# Patient Record
Sex: FEMALE | Race: WHITE | NOT HISPANIC OR LATINO | Employment: OTHER | URBAN - METROPOLITAN AREA
[De-identification: names, ages, dates, MRNs, and addresses within clinical notes are randomized per-mention and may not be internally consistent; named-entity substitution may affect disease eponyms.]

---

## 2018-03-29 ENCOUNTER — OFFICE VISIT CONVERTED (OUTPATIENT)
Dept: CARDIOLOGY | Facility: CLINIC | Age: 83
End: 2018-03-29
Attending: INTERNAL MEDICINE

## 2018-07-13 ENCOUNTER — OFFICE VISIT CONVERTED (OUTPATIENT)
Dept: CARDIOLOGY | Facility: CLINIC | Age: 83
End: 2018-07-13
Attending: INTERNAL MEDICINE

## 2018-07-13 ENCOUNTER — CONVERSION ENCOUNTER (OUTPATIENT)
Dept: CARDIOLOGY | Facility: CLINIC | Age: 83
End: 2018-07-13

## 2019-01-17 ENCOUNTER — CONVERSION ENCOUNTER (OUTPATIENT)
Dept: OTHER | Facility: HOSPITAL | Age: 84
End: 2019-01-17

## 2019-01-17 ENCOUNTER — OFFICE VISIT CONVERTED (OUTPATIENT)
Dept: CARDIOLOGY | Facility: CLINIC | Age: 84
End: 2019-01-17
Attending: INTERNAL MEDICINE

## 2019-06-25 ENCOUNTER — HOSPITAL ENCOUNTER (OUTPATIENT)
Dept: URGENT CARE | Facility: CLINIC | Age: 84
Discharge: HOME | End: 2019-06-25
Attending: PHYSICIAN ASSISTANT

## 2019-08-02 ENCOUNTER — OFFICE VISIT CONVERTED (OUTPATIENT)
Dept: CARDIOLOGY | Facility: CLINIC | Age: 84
End: 2019-08-02
Attending: INTERNAL MEDICINE

## 2020-02-27 ENCOUNTER — OFFICE VISIT CONVERTED (OUTPATIENT)
Dept: CARDIOLOGY | Facility: CLINIC | Age: 85
End: 2020-02-27
Attending: INTERNAL MEDICINE

## 2020-09-08 ENCOUNTER — HOSPITAL ENCOUNTER (OUTPATIENT)
Dept: URGENT CARE | Facility: CLINIC | Age: 85
Discharge: HOME | End: 2020-09-08

## 2021-04-15 ENCOUNTER — OFFICE VISIT CONVERTED (OUTPATIENT)
Dept: CARDIOLOGY | Facility: CLINIC | Age: 86
End: 2021-04-15
Attending: INTERNAL MEDICINE

## 2021-05-14 VITALS
HEIGHT: 62 IN | DIASTOLIC BLOOD PRESSURE: 84 MMHG | WEIGHT: 130 LBS | SYSTOLIC BLOOD PRESSURE: 154 MMHG | BODY MASS INDEX: 23.92 KG/M2 | HEART RATE: 92 BPM

## 2021-05-14 NOTE — PROGRESS NOTES
"   Progress Note      Patient Name: Sarina Trevino   Patient ID: [id number]   Sex: Female   YOB: 1932    Primary Care Provider: Allie GARCIA   Referring Provider: Andrés Curiel MD    Visit Date: April 15, 2021    Provider: Andrés Curiel MD   Location: [facility]   Location Address: [address] [place] [address], [facility]   [place], [place]  [id number]   Location Phone: [phone]          Chief Complaint     CAD.       History Of Present Illness  REFERRING CARE PROVIDER: Andrés Curiel MD   Sarina Trevino is a 88 year old /White female with known coronary artery disease with previous hypertension and dyslipidemia who has not had any new problems at home. She states that her blood pressure is sometimes on the low side. She has stable intermittent heart palpitations.   PAST MEDICAL HISTORY: Positive for coronary artery disease, hypertension and dyslipidemia.   PSYCHOSOCIAL HISTORY: Denies use of alcohol or tobacco.   CURRENT MEDICATIONS: Medications have been reviewed and are as stated.      ALLERGIES:  Codeine; Sulfa.       Review of Systems  · Cardiovascular  o Admits  o : palpitations (fast, fluttering, or skipping beats), shortness of breath while walking or lying flat  o Denies  o : swelling (feet, ankles, hands), chest pain or angina pectoris   · Respiratory  o Denies  o : chronic or frequent cough      Vitals  Date Time BP Position Site L\R Cuff Size HR RR TEMP (F) WT  HT  BMI kg/m2 BSA m2 O2 Sat FR L/min FiO2 HC       04/15/2021 02:14 /84 Sitting    92 - R   130lbs 0oz 5'  2\" 23.78 1.61       04/15/2021 02:14 /76 Sitting    90 - R                   Physical Examination  · Constitutional  o Appearance  o : Awake, alert, in no acute distress.   · Eyes  o Conjunctivae  o : Normal.  · Ears, Nose, Mouth and Throat  o Oral Cavity  o :   § Oral Mucosa  § : Normal.  · Neck  o Inspection/Palpation  o : No JVD. Good carotid upstroke. No thyromegaly.  · Respiratory  o Respiratory  o : Good " respiratory effort. Clear to percussion and auscultation.  · Cardiovascular  o Heart  o :   § Auscultation of Heart  § : S1, S2 normal. Regular rate and rhythm without murmurs, gallops, or rubs.  o Peripheral Vascular System  o :   § Extremities  § : Good femoral and pedal pulses. No pedal edema.  · Gastrointestinal  o Abdominal Examination  o : Soft. No tenderness or masses felt. No hepatosplenomegaly. Abdominal aorta is not palpable.          Assessment     1.  Coronary artery disease with no ongoing anginal discomfort, continue with chronic aspirin 81 mg once a day.  2.  Hypertension. Elevated today, suspect white coat in nature. Recommended a home blood pressure long for monitoring and no changes made.         Adnrés Curiel MD  /               Electronically Signed by: Layne Mccarty-, OT -Author on April 22, 2021 08:34:09 AM  Electronically Co-signed by: Andrés Curiel MD -Reviewer on April 26, 2021 07:51:51 AM

## 2021-05-15 VITALS
HEART RATE: 64 BPM | WEIGHT: 150 LBS | BODY MASS INDEX: 26.58 KG/M2 | DIASTOLIC BLOOD PRESSURE: 64 MMHG | SYSTOLIC BLOOD PRESSURE: 112 MMHG | HEIGHT: 63 IN

## 2021-05-15 VITALS
HEIGHT: 63 IN | DIASTOLIC BLOOD PRESSURE: 88 MMHG | BODY MASS INDEX: 26.05 KG/M2 | SYSTOLIC BLOOD PRESSURE: 148 MMHG | WEIGHT: 147 LBS | HEART RATE: 60 BPM

## 2021-05-16 VITALS
HEIGHT: 63 IN | DIASTOLIC BLOOD PRESSURE: 84 MMHG | WEIGHT: 146 LBS | HEART RATE: 58 BPM | BODY MASS INDEX: 25.87 KG/M2 | SYSTOLIC BLOOD PRESSURE: 160 MMHG

## 2021-05-16 VITALS
HEART RATE: 72 BPM | BODY MASS INDEX: 26.75 KG/M2 | HEIGHT: 63 IN | SYSTOLIC BLOOD PRESSURE: 182 MMHG | WEIGHT: 151 LBS | DIASTOLIC BLOOD PRESSURE: 80 MMHG

## 2021-05-16 VITALS
HEIGHT: 63 IN | DIASTOLIC BLOOD PRESSURE: 80 MMHG | SYSTOLIC BLOOD PRESSURE: 142 MMHG | WEIGHT: 149 LBS | HEART RATE: 64 BPM | BODY MASS INDEX: 26.4 KG/M2

## 2021-10-20 ENCOUNTER — OFFICE VISIT (OUTPATIENT)
Dept: CARDIOLOGY | Facility: CLINIC | Age: 86
End: 2021-10-20

## 2021-10-20 VITALS
WEIGHT: 125 LBS | BODY MASS INDEX: 23 KG/M2 | DIASTOLIC BLOOD PRESSURE: 72 MMHG | HEART RATE: 56 BPM | HEIGHT: 62 IN | SYSTOLIC BLOOD PRESSURE: 106 MMHG

## 2021-10-20 DIAGNOSIS — E78.5 HYPERLIPIDEMIA LDL GOAL <70: ICD-10-CM

## 2021-10-20 DIAGNOSIS — I25.10 CORONARY ARTERY DISEASE INVOLVING NATIVE CORONARY ARTERY OF NATIVE HEART WITHOUT ANGINA PECTORIS: Primary | ICD-10-CM

## 2021-10-20 DIAGNOSIS — I10 ESSENTIAL HYPERTENSION: ICD-10-CM

## 2021-10-20 PROBLEM — J30.9 ALLERGIC RHINITIS: Status: ACTIVE | Noted: 2021-10-20

## 2021-10-20 PROBLEM — M19.90 ARTHRITIS: Status: ACTIVE | Noted: 2021-10-20

## 2021-10-20 PROCEDURE — 99213 OFFICE O/P EST LOW 20 MIN: CPT | Performed by: NURSE PRACTITIONER

## 2021-10-20 RX ORDER — ASPIRIN 81 MG/1
81 TABLET ORAL DAILY
COMMUNITY

## 2021-10-20 RX ORDER — SENNOSIDES 8.6 MG
650 CAPSULE ORAL EVERY 8 HOURS PRN
COMMUNITY
End: 2022-01-09 | Stop reason: HOSPADM

## 2021-10-20 RX ORDER — METOPROLOL SUCCINATE 25 MG/1
25 TABLET, EXTENDED RELEASE ORAL DAILY
COMMUNITY
Start: 2021-07-27 | End: 2021-10-27 | Stop reason: SDUPTHER

## 2021-10-20 RX ORDER — TELMISARTAN 80 MG/1
160 TABLET ORAL DAILY
COMMUNITY
Start: 2021-09-09 | End: 2021-10-27

## 2021-10-20 RX ORDER — NETARSUDIL 0.2 MG/ML
SOLUTION/ DROPS OPHTHALMIC; TOPICAL
COMMUNITY
Start: 2021-07-27 | End: 2022-01-02

## 2021-10-20 RX ORDER — CETIRIZINE HYDROCHLORIDE 10 MG/1
TABLET ORAL
COMMUNITY
End: 2022-01-02

## 2021-10-20 RX ORDER — HYDROCHLOROTHIAZIDE 25 MG/1
25 TABLET ORAL DAILY
COMMUNITY
Start: 2021-09-09 | End: 2021-10-27 | Stop reason: SDUPTHER

## 2021-10-20 NOTE — PATIENT INSTRUCTIONS
"Low-Sodium Eating Plan  Sodium, which is an element that makes up salt, helps you maintain a healthy balance of fluids in your body. Too much sodium can increase your blood pressure and cause fluid and waste to be held in your body.  Your health care provider or dietitian may recommend following this plan if you have high blood pressure (hypertension), kidney disease, liver disease, or heart failure. Eating less sodium can help lower your blood pressure, reduce swelling, and protect your heart, liver, and kidneys.  What are tips for following this plan?  Reading food labels  · The Nutrition Facts label lists the amount of sodium in one serving of the food. If you eat more than one serving, you must multiply the listed amount of sodium by the number of servings.  · Choose foods with less than 140 mg of sodium per serving.  · Avoid foods with 300 mg of sodium or more per serving.  Shopping    · Look for lower-sodium products, often labeled as \"low-sodium\" or \"no salt added.\"  · Always check the sodium content, even if foods are labeled as \"unsalted\" or \"no salt added.\"  · Buy fresh foods.  ? Avoid canned foods and pre-made or frozen meals.  ? Avoid canned, cured, or processed meats.  · Buy breads that have less than 80 mg of sodium per slice.    Cooking    · Eat more home-cooked food and less restaurant, buffet, and fast food.  · Avoid adding salt when cooking. Use salt-free seasonings or herbs instead of table salt or sea salt. Check with your health care provider or pharmacist before using salt substitutes.  · Cook with plant-based oils, such as canola, sunflower, or olive oil.    Meal planning  · When eating at a restaurant, ask that your food be prepared with less salt or no salt, if possible. Avoid dishes labeled as brined, pickled, cured, smoked, or made with soy sauce, miso, or teriyaki sauce.  · Avoid foods that contain MSG (monosodium glutamate). MSG is sometimes added to Chinese food, bouillon, and some " "canned foods.  · Make meals that can be grilled, baked, poached, roasted, or steamed. These are generally made with less sodium.  General information  Most people on this plan should limit their sodium intake to 1,500-2,000 mg (milligrams) of sodium each day.  What foods should I eat?  Fruits  Fresh, frozen, or canned fruit. Fruit juice.  Vegetables  Fresh or frozen vegetables. \"No salt added\" canned vegetables. \"No salt added\" tomato sauce and paste. Low-sodium or reduced-sodium tomato and vegetable juice.  Grains  Low-sodium cereals, including oats, puffed wheat and rice, and shredded wheat. Low-sodium crackers. Unsalted rice. Unsalted pasta. Low-sodium bread. Whole-grain breads and whole-grain pasta.  Meats and other proteins  Fresh or frozen (no salt added) meat, poultry, seafood, and fish. Low-sodium canned tuna and salmon. Unsalted nuts. Dried peas, beans, and lentils without added salt. Unsalted canned beans. Eggs. Unsalted nut butters.  Dairy  Milk. Soy milk. Cheese that is naturally low in sodium, such as ricotta cheese, fresh mozzarella, or Swiss cheese. Low-sodium or reduced-sodium cheese. Cream cheese. Yogurt.  Seasonings and condiments  Fresh and dried herbs and spices. Salt-free seasonings. Low-sodium mustard and ketchup. Sodium-free salad dressing. Sodium-free light mayonnaise. Fresh or refrigerated horseradish. Lemon juice. Vinegar.  Other foods  Homemade, reduced-sodium, or low-sodium soups. Unsalted popcorn and pretzels. Low-salt or salt-free chips.  The items listed above may not be a complete list of foods and beverages you can eat. Contact a dietitian for more information.  What foods should I avoid?  Vegetables  Sauerkraut, pickled vegetables, and relishes. Olives. French fries. Onion rings. Regular canned vegetables (not low-sodium or reduced-sodium). Regular canned tomato sauce and paste (not low-sodium or reduced-sodium). Regular tomato and vegetable juice (not low-sodium or reduced-sodium). " Frozen vegetables in sauces.  Grains  Instant hot cereals. Bread stuffing, pancake, and biscuit mixes. Croutons. Seasoned rice or pasta mixes. Noodle soup cups. Boxed or frozen macaroni and cheese. Regular salted crackers. Self-rising flour.  Meats and other proteins  Meat or fish that is salted, canned, smoked, spiced, or pickled. Precooked or cured meat, such as sausages or meat loaves. He. Ham. Pepperoni. Hot dogs. Corned beef. Chipped beef. Salt pork. Jerky. Pickled herring. Anchovies and sardines. Regular canned tuna. Salted nuts.  Dairy  Processed cheese and cheese spreads. Hard cheeses. Cheese curds. Blue cheese. Feta cheese. String cheese. Regular cottage cheese. Buttermilk. Canned milk.  Fats and oils  Salted butter. Regular margarine. Ghee. He fat.  Seasonings and condiments  Onion salt, garlic salt, seasoned salt, table salt, and sea salt. Canned and packaged gravies. Worcestershire sauce. Tartar sauce. Barbecue sauce. Teriyaki sauce. Soy sauce, including reduced-sodium. Steak sauce. Fish sauce. Oyster sauce. Cocktail sauce. Horseradish that you find on the shelf. Regular ketchup and mustard. Meat flavorings and tenderizers. Bouillon cubes. Hot sauce. Pre-made or packaged marinades. Pre-made or packaged taco seasonings. Relishes. Regular salad dressings. Salsa.  Other foods  Salted popcorn and pretzels. Corn chips and puffs. Potato and tortilla chips. Canned or dried soups. Pizza. Frozen entrees and pot pies.  The items listed above may not be a complete list of foods and beverages you should avoid. Contact a dietitian for more information.  Summary  · Eating less sodium can help lower your blood pressure, reduce swelling, and protect your heart, liver, and kidneys.  · Most people on this plan should limit their sodium intake to 1,500-2,000 mg (milligrams) of sodium each day.  · Canned, boxed, and frozen foods are high in sodium. Restaurant foods, fast foods, and pizza are also very high in sodium.  You also get sodium by adding salt to food.  · Try to cook at home, eat more fresh fruits and vegetables, and eat less fast food and canned, processed, or prepared foods.  This information is not intended to replace advice given to you by your health care provider. Make sure you discuss any questions you have with your health care provider.  Document Revised: 01/22/2021 Document Reviewed: 11/18/2020  [employer] Patient Education © 2021 [employer]      Cholesterol Content in Foods  Cholesterol is a waxy, fat-like substance that helps to carry fat in the blood. The body needs cholesterol in small amounts, but too much cholesterol can cause damage to the arteries and heart.  Most people should eat less than 200 milligrams (mg) of cholesterol a day.  Foods with cholesterol    Cholesterol is found in animal-based foods, such as meat, seafood, and dairy. Generally, low-fat dairy and lean meats have less cholesterol than full-fat dairy and fatty meats. The milligrams of cholesterol per serving (mg per serving) of common cholesterol-containing foods are listed below.  Meat and other proteins  · Egg -- one large whole egg has 186 mg.  · Veal shank -- 4 oz has 141 mg.  · Lean ground turkey (93% lean) -- 4 oz has 118 mg.  · Fat-trimmed lamb loin -- 4 oz has 106 mg.  · Lean ground beef (90% lean) -- 4 oz has 100 mg.  · Lobster -- 3.5 oz has 90 mg.  · Pork loin chops -- 4 oz has 86 mg.  · Canned salmon -- 3.5 oz has 83 mg.  · Fat-trimmed beef top loin -- 4 oz has 78 mg.  · Frankfurter -- 1 evelia (3.5 oz) has 77 mg.  · Crab -- 3.5 oz has 71 mg.  · Roasted chicken without skin, white meat -- 4 oz has 66 mg.  · Light bologna -- 2 oz has 45 mg.  · Deli-cut turkey -- 2 oz has 31 mg.  · Canned tuna -- 3.5 oz has 31 mg.  · He -- 1 oz has 29 mg.  · Oysters and mussels (raw) -- 3.5 oz has 25 mg.  · Mackerel -- 1 oz has 22 mg.  · Trout -- 1 oz has 20 mg.  · Pork sausage -- 1 link (1 oz) has 17 mg.  · Olean -- 1 oz has 16  mg.  · Tilapia -- 1 oz has 14 mg.  Dairy  · Soft-serve ice cream -- ½ cup (4 oz) has 103 mg.  · Whole-milk yogurt -- 1 cup (8 oz) has 29 mg.  · Cheddar cheese -- 1 oz has 28 mg.  · American cheese -- 1 oz has 28 mg.  · Whole milk -- 1 cup (8 oz) has 23 mg.  · 2% milk -- 1 cup (8 oz) has 18 mg.  · Cream cheese -- 1 tablespoon (Tbsp) has 15 mg.  · Cottage cheese -- ½ cup (4 oz) has 14 mg.  · Low-fat (1%) milk -- 1 cup (8 oz) has 10 mg.  · Sour cream -- 1 Tbsp has 8.5 mg.  · Low-fat yogurt -- 1 cup (8 oz) has 8 mg.  · Nonfat Greek yogurt -- 1 cup (8 oz) has 7 mg.  · Half-and-half cream -- 1 Tbsp has 5 mg.  Fats and oils  · Cod liver oil -- 1 tablespoon (Tbsp) has 82 mg.  · Butter -- 1 Tbsp has 15 mg.  · Lard -- 1 Tbsp has 14 mg.  · He grease -- 1 Tbsp has 14 mg.  · Mayonnaise -- 1 Tbsp has 5-10 mg.  · Margarine -- 1 Tbsp has 3-10 mg.  Exact amounts of cholesterol in these foods may vary depending on specific ingredients and brands.  Foods without cholesterol  Most plant-based foods do not have cholesterol unless you combine them with a food that has cholesterol. Foods without cholesterol include:  · Grains and cereals.  · Vegetables.  · Fruits.  · Vegetable oils, such as olive, canola, and sunflower oil.  · Legumes, such as peas, beans, and lentils.  · Nuts and seeds.  · Egg whites.  Summary  · The body needs cholesterol in small amounts, but too much cholesterol can cause damage to the arteries and heart.  · Most people should eat less than 200 milligrams (mg) of cholesterol a day.  This information is not intended to replace advice given to you by your health care provider. Make sure you discuss any questions you have with your health care provider.  Document Revised: 05/10/2021 Document Reviewed: 05/10/2021  [employer] Patient Education © 2021 [employer]

## 2021-10-20 NOTE — PROGRESS NOTES
Chief Complaint  Coronary Artery Disease, Hypertension, and Hyperlipidemia    Subjective            History of Present Illness  Sarina Trevino is a 89-year-old white/ female patient who presents to the office today for follow-up.  She has CAD, hypertension, and hyperlipidemia.  She admits to some confusion regarding her blood pressure medication dose.  She is currently prescribed telmisartan 80 mg twice daily.  She brought in a home blood pressure log that shows some blood pressures as low as 90/50 after taking the second dose.  She reports some fatigue and weakness when her blood pressure is so low.  She is wondering if she should be taking a second dose.  She denies any chest pain, shortness of breath, lightheadedness/dizziness, edema, or palpitations.    PMH  Past Medical History:   Diagnosis Date   • CAD 10/20/2021   • Cancer (HCC)    • Essential hypertension 10/20/2021   • Hyperlipidemia LDL goal <70 10/20/2021         ALLERGY  Allergies   Allergen Reactions   • Meloxicam Dizziness          SURGICALHX  History reviewed. No pertinent surgical history.       SOC  Social History     Socioeconomic History   • Marital status:    Tobacco Use   • Smoking status: Never Smoker   • Smokeless tobacco: Never Used   Vaping Use   • Vaping Use: Never used   Substance and Sexual Activity   • Alcohol use: Never   • Drug use: Never   • Sexual activity: Defer         FAMHX  History reviewed. No pertinent family history.       MEDSIGONLY  Current Outpatient Medications on File Prior to Visit   Medication Sig   • acetaminophen (TYLENOL) 650 MG 8 hr tablet acetaminophen 650 mg oral tablet extended release take 1 tablets (650 mg) by oral route every 8 hours swallowing whole with water. Do not break, crush, dissolve and/or chew.   Active   • aspirin (aspirin) 81 MG EC tablet Aspir-81 81 mg oral tablet,delayed release (DR/EC) take 1 tablet (81 mg) by oral route once daily   Active   • cetirizine (zyrTEC) 10 MG tablet  "cetirizine 10 mg oral tablet take 1 tablet (10 mg) by oral route once daily   Suspended   • hydroCHLOROthiazide (HYDRODIURIL) 25 MG tablet Take 25 mg by mouth Daily.   • metoprolol succinate XL (TOPROL-XL) 25 MG 24 hr tablet Take 25 mg by mouth Daily.   • Rhopressa 0.02 % solution instill 1 DROP IN EACH EYE EVERY NIGHT AT BEDTIME   • telmisartan (MICARDIS) 80 MG tablet Take 160 mg by mouth Daily.     No current facility-administered medications on file prior to visit.         Objective   /72   Pulse 56   Ht 157.5 cm (62\")   Wt 56.7 kg (125 lb)   BMI 22.86 kg/m²       Physical Exam  Constitutional:       Appearance: She is normal weight.   HENT:      Head: Normocephalic.   Neck:      Vascular: No carotid bruit.   Cardiovascular:      Rate and Rhythm: Normal rate and regular rhythm.      Pulses: Normal pulses.      Heart sounds: Normal heart sounds. No murmur heard.      Pulmonary:      Effort: Pulmonary effort is normal.      Breath sounds: Wheezing present.   Musculoskeletal:      Cervical back: Neck supple.      Right lower leg: No edema.      Left lower leg: No edema.   Skin:     General: Skin is dry.      Capillary Refill: Capillary refill takes less than 2 seconds.   Neurological:      Mental Status: She is alert and oriented to person, place, and time.   Psychiatric:         Mood and Affect: Mood normal.         Behavior: Behavior normal.       Result Review :   The following data was reviewed by: YOUNG Deras on 10/20/2021:  No results found for: PROBNP    No results found for: TSH   No results found for: FREET4   No results found for: DDIMERQUANT  No results found for: MG   No results found for: DIGOXIN   Lab Results   Component Value Date    TROPONINT <0.01 09/08/2020           CNP 9/8/2020  BUN 26  Creatinine 1.26  GFR 38  Sodium 135  Potassium 3.7  Alkaline phosphatase 83  ALT 12  AST 23    No recent lipid panel for review    She reports that she will be having labs done \"soon\" through " home health which was initiated this past week.  She states that she will have a copy of her results sent to this office for review.     Assessment and Plan    Diagnoses and all orders for this visit:    1. CAD (Primary)  Currently denies any anginal symptoms, continue aspirin 81 mg daily.    2. Essential hypertension  Currently controlled with low blood pressure readings at home.  New parameters for blood pressure given to patient.  Check blood pressure twice a day for the next two weeks, blood pressure log provided for patient.  89/49 or lower: Do not take Telmisartan  90//89: Take Telmisartan once daily  140/90 or higher: Take Telmisartan twice daily  Continue metoprolol 25 mg daily and hydrochlorothiazide 25 mg daily.    3. Hyperlipidemia LDL goal <70  We will review numerous lipid panel once available to me.  She is not currently on statin therapy at this time.            Follow Up   Return in about 6 months (around 4/20/2022) for Follow up with Dr Curiel.    Patient was given instructions and counseling regarding her condition or for health maintenance advice. Please see specific information pulled into the AVS if appropriate.     Sarina Trevino  reports that she has never smoked. She has never used smokeless tobacco.           Poonam Weston, APRN  10/20/21  11:55 EDT    Dictated Utilizing Dragon Dictation

## 2021-10-27 RX ORDER — METOPROLOL SUCCINATE 25 MG/1
25 TABLET, EXTENDED RELEASE ORAL DAILY
Qty: 90 TABLET | Refills: 3 | Status: SHIPPED | OUTPATIENT
Start: 2021-10-27

## 2021-10-27 RX ORDER — HYDROCHLOROTHIAZIDE 25 MG/1
25 TABLET ORAL DAILY
Qty: 90 TABLET | Refills: 3 | Status: SHIPPED | OUTPATIENT
Start: 2021-10-27

## 2021-10-27 RX ORDER — TELMISARTAN 80 MG/1
160 TABLET ORAL DAILY
Qty: 180 TABLET | Refills: 2 | Status: ON HOLD | OUTPATIENT
Start: 2021-10-27 | End: 2022-01-09 | Stop reason: SDUPTHER

## 2021-11-09 ENCOUNTER — TELEPHONE (OUTPATIENT)
Dept: CARDIOLOGY | Facility: CLINIC | Age: 86
End: 2021-11-09

## 2021-11-09 NOTE — TELEPHONE ENCOUNTER
Spoke with the pt and she understood to continue current regimen. She had no questions at this time.

## 2022-01-02 ENCOUNTER — APPOINTMENT (OUTPATIENT)
Dept: GENERAL RADIOLOGY | Facility: HOSPITAL | Age: 87
End: 2022-01-02

## 2022-01-02 ENCOUNTER — APPOINTMENT (OUTPATIENT)
Dept: CT IMAGING | Facility: HOSPITAL | Age: 87
End: 2022-01-02

## 2022-01-02 ENCOUNTER — HOSPITAL ENCOUNTER (INPATIENT)
Facility: HOSPITAL | Age: 87
LOS: 7 days | Discharge: NURSING, REHAB OR LONG-TERM CARE | End: 2022-01-09
Attending: EMERGENCY MEDICINE | Admitting: INTERNAL MEDICINE

## 2022-01-02 DIAGNOSIS — M25.552 LEFT HIP PAIN: Primary | ICD-10-CM

## 2022-01-02 DIAGNOSIS — S32.9XXS CLOSED NONDISPLACED FRACTURE OF PELVIS, UNSPECIFIED PART OF PELVIS, SEQUELA: ICD-10-CM

## 2022-01-02 DIAGNOSIS — R26.2 DIFFICULTY IN WALKING: ICD-10-CM

## 2022-01-02 DIAGNOSIS — R13.12 OROPHARYNGEAL DYSPHAGIA: ICD-10-CM

## 2022-01-02 DIAGNOSIS — S32.502A CLOSED FRACTURE OF LEFT PUBIS, UNSPECIFIED PORTION OF PUBIS, INITIAL ENCOUNTER: ICD-10-CM

## 2022-01-02 DIAGNOSIS — Z78.9 DECREASED ACTIVITIES OF DAILY LIVING (ADL): ICD-10-CM

## 2022-01-02 PROBLEM — S32.9XXA PELVIC FRACTURE: Status: ACTIVE | Noted: 2022-01-02

## 2022-01-02 LAB
ALBUMIN SERPL-MCNC: 4.3 G/DL (ref 3.5–5.2)
ALBUMIN/GLOB SERPL: 1.2 G/DL
ALP SERPL-CCNC: 82 U/L (ref 39–117)
ALT SERPL W P-5'-P-CCNC: 13 U/L (ref 1–33)
ANION GAP SERPL CALCULATED.3IONS-SCNC: 12.6 MMOL/L (ref 5–15)
AST SERPL-CCNC: 28 U/L (ref 1–32)
BACTERIA UR QL AUTO: ABNORMAL /HPF
BASOPHILS # BLD AUTO: 0.06 10*3/MM3 (ref 0–0.2)
BASOPHILS NFR BLD AUTO: 0.3 % (ref 0–1.5)
BILIRUB SERPL-MCNC: 0.6 MG/DL (ref 0–1.2)
BILIRUB UR QL STRIP: NEGATIVE
BUN SERPL-MCNC: 29 MG/DL (ref 8–23)
BUN/CREAT SERPL: 28.2 (ref 7–25)
CALCIUM SPEC-SCNC: 9.5 MG/DL (ref 8.6–10.5)
CHLORIDE SERPL-SCNC: 95 MMOL/L (ref 98–107)
CK SERPL-CCNC: 171 U/L (ref 20–180)
CLARITY UR: CLEAR
CO2 SERPL-SCNC: 26.4 MMOL/L (ref 22–29)
COLOR UR: YELLOW
CREAT SERPL-MCNC: 1.03 MG/DL (ref 0.57–1)
DEPRECATED RDW RBC AUTO: 43.2 FL (ref 37–54)
EOSINOPHIL # BLD AUTO: 0.04 10*3/MM3 (ref 0–0.4)
EOSINOPHIL NFR BLD AUTO: 0.2 % (ref 0.3–6.2)
ERYTHROCYTE [DISTWIDTH] IN BLOOD BY AUTOMATED COUNT: 12.6 % (ref 12.3–15.4)
GFR SERPL CREATININE-BSD FRML MDRD: 50 ML/MIN/1.73
GLOBULIN UR ELPH-MCNC: 3.5 GM/DL
GLUCOSE SERPL-MCNC: 109 MG/DL (ref 65–99)
GLUCOSE UR STRIP-MCNC: NEGATIVE MG/DL
HCT VFR BLD AUTO: 33.1 % (ref 34–46.6)
HGB BLD-MCNC: 11 G/DL (ref 12–15.9)
HGB UR QL STRIP.AUTO: NEGATIVE
HYALINE CASTS UR QL AUTO: ABNORMAL /LPF
IMM GRANULOCYTES # BLD AUTO: 0.17 10*3/MM3 (ref 0–0.05)
IMM GRANULOCYTES NFR BLD AUTO: 0.9 % (ref 0–0.5)
KETONES UR QL STRIP: ABNORMAL
LEUKOCYTE ESTERASE UR QL STRIP.AUTO: NEGATIVE
LYMPHOCYTES # BLD AUTO: 1.27 10*3/MM3 (ref 0.7–3.1)
LYMPHOCYTES NFR BLD AUTO: 7 % (ref 19.6–45.3)
MAGNESIUM SERPL-MCNC: 2.3 MG/DL (ref 1.6–2.4)
MCH RBC QN AUTO: 31 PG (ref 26.6–33)
MCHC RBC AUTO-ENTMCNC: 33.2 G/DL (ref 31.5–35.7)
MCV RBC AUTO: 93.2 FL (ref 79–97)
MONOCYTES # BLD AUTO: 0.89 10*3/MM3 (ref 0.1–0.9)
MONOCYTES NFR BLD AUTO: 4.9 % (ref 5–12)
NEUTROPHILS NFR BLD AUTO: 15.62 10*3/MM3 (ref 1.7–7)
NEUTROPHILS NFR BLD AUTO: 86.7 % (ref 42.7–76)
NITRITE UR QL STRIP: NEGATIVE
NRBC BLD AUTO-RTO: 0 /100 WBC (ref 0–0.2)
PH UR STRIP.AUTO: 7.5 [PH] (ref 5–8)
PLATELET # BLD AUTO: 314 10*3/MM3 (ref 140–450)
PMV BLD AUTO: 9.6 FL (ref 6–12)
POTASSIUM SERPL-SCNC: 3.9 MMOL/L (ref 3.5–5.2)
PROT SERPL-MCNC: 7.8 G/DL (ref 6–8.5)
PROT UR QL STRIP: ABNORMAL
RBC # BLD AUTO: 3.55 10*6/MM3 (ref 3.77–5.28)
RBC # UR STRIP: ABNORMAL /HPF
REF LAB TEST METHOD: ABNORMAL
SODIUM SERPL-SCNC: 134 MMOL/L (ref 136–145)
SP GR UR STRIP: 1.02 (ref 1–1.03)
SQUAMOUS #/AREA URNS HPF: ABNORMAL /HPF
T4 FREE SERPL-MCNC: 1.59 NG/DL (ref 0.93–1.7)
TROPONIN T SERPL-MCNC: 0.02 NG/ML (ref 0–0.03)
TROPONIN T SERPL-MCNC: <0.01 NG/ML (ref 0–0.03)
TSH SERPL DL<=0.05 MIU/L-ACNC: 2.53 UIU/ML (ref 0.27–4.2)
TSH SERPL DL<=0.05 MIU/L-ACNC: 2.59 UIU/ML (ref 0.27–4.2)
UROBILINOGEN UR QL STRIP: ABNORMAL
WBC # UR STRIP: ABNORMAL /HPF
WBC NRBC COR # BLD: 18.05 10*3/MM3 (ref 3.4–10.8)

## 2022-01-02 PROCEDURE — 99284 EMERGENCY DEPT VISIT MOD MDM: CPT

## 2022-01-02 PROCEDURE — 84484 ASSAY OF TROPONIN QUANT: CPT | Performed by: INTERNAL MEDICINE

## 2022-01-02 PROCEDURE — 84443 ASSAY THYROID STIM HORMONE: CPT | Performed by: INTERNAL MEDICINE

## 2022-01-02 PROCEDURE — 82550 ASSAY OF CK (CPK): CPT | Performed by: EMERGENCY MEDICINE

## 2022-01-02 PROCEDURE — 84443 ASSAY THYROID STIM HORMONE: CPT | Performed by: EMERGENCY MEDICINE

## 2022-01-02 PROCEDURE — 80053 COMPREHEN METABOLIC PANEL: CPT | Performed by: EMERGENCY MEDICINE

## 2022-01-02 PROCEDURE — 93010 ELECTROCARDIOGRAM REPORT: CPT | Performed by: INTERNAL MEDICINE

## 2022-01-02 PROCEDURE — 85025 COMPLETE CBC W/AUTO DIFF WBC: CPT | Performed by: EMERGENCY MEDICINE

## 2022-01-02 PROCEDURE — 25010000002 MORPHINE PER 10 MG: Performed by: INTERNAL MEDICINE

## 2022-01-02 PROCEDURE — 83735 ASSAY OF MAGNESIUM: CPT | Performed by: EMERGENCY MEDICINE

## 2022-01-02 PROCEDURE — 73502 X-RAY EXAM HIP UNI 2-3 VIEWS: CPT

## 2022-01-02 PROCEDURE — 25010000002 MORPHINE PER 10 MG: Performed by: EMERGENCY MEDICINE

## 2022-01-02 PROCEDURE — 70450 CT HEAD/BRAIN W/O DYE: CPT

## 2022-01-02 PROCEDURE — 99223 1ST HOSP IP/OBS HIGH 75: CPT | Performed by: INTERNAL MEDICINE

## 2022-01-02 PROCEDURE — 81001 URINALYSIS AUTO W/SCOPE: CPT | Performed by: INTERNAL MEDICINE

## 2022-01-02 PROCEDURE — 71045 X-RAY EXAM CHEST 1 VIEW: CPT

## 2022-01-02 PROCEDURE — 72100 X-RAY EXAM L-S SPINE 2/3 VWS: CPT

## 2022-01-02 PROCEDURE — 84439 ASSAY OF FREE THYROXINE: CPT | Performed by: EMERGENCY MEDICINE

## 2022-01-02 PROCEDURE — 93005 ELECTROCARDIOGRAM TRACING: CPT | Performed by: EMERGENCY MEDICINE

## 2022-01-02 PROCEDURE — 72125 CT NECK SPINE W/O DYE: CPT

## 2022-01-02 PROCEDURE — 36415 COLL VENOUS BLD VENIPUNCTURE: CPT | Performed by: INTERNAL MEDICINE

## 2022-01-02 PROCEDURE — 0HQ1XZZ REPAIR FACE SKIN, EXTERNAL APPROACH: ICD-10-PCS

## 2022-01-02 RX ORDER — NITROGLYCERIN 0.4 MG/1
0.4 TABLET SUBLINGUAL
Status: DISCONTINUED | OUTPATIENT
Start: 2022-01-02 | End: 2022-01-04

## 2022-01-02 RX ORDER — FAMOTIDINE 20 MG/1
40 TABLET, FILM COATED ORAL DAILY
Status: DISCONTINUED | OUTPATIENT
Start: 2022-01-02 | End: 2022-01-09 | Stop reason: HOSPADM

## 2022-01-02 RX ORDER — SODIUM CHLORIDE 0.9 % (FLUSH) 0.9 %
10 SYRINGE (ML) INJECTION EVERY 12 HOURS SCHEDULED
Status: DISCONTINUED | OUTPATIENT
Start: 2022-01-02 | End: 2022-01-09 | Stop reason: HOSPADM

## 2022-01-02 RX ORDER — BISACODYL 5 MG/1
5 TABLET, DELAYED RELEASE ORAL DAILY PRN
Status: DISCONTINUED | OUTPATIENT
Start: 2022-01-02 | End: 2022-01-09 | Stop reason: HOSPADM

## 2022-01-02 RX ORDER — AMOXICILLIN 250 MG
2 CAPSULE ORAL 2 TIMES DAILY
Status: DISCONTINUED | OUTPATIENT
Start: 2022-01-02 | End: 2022-01-09 | Stop reason: HOSPADM

## 2022-01-02 RX ORDER — POLYETHYLENE GLYCOL 3350 17 G/17G
17 POWDER, FOR SOLUTION ORAL DAILY PRN
Status: DISCONTINUED | OUTPATIENT
Start: 2022-01-02 | End: 2022-01-09 | Stop reason: HOSPADM

## 2022-01-02 RX ORDER — BISACODYL 10 MG
10 SUPPOSITORY, RECTAL RECTAL DAILY PRN
Status: DISCONTINUED | OUTPATIENT
Start: 2022-01-02 | End: 2022-01-09 | Stop reason: HOSPADM

## 2022-01-02 RX ORDER — SODIUM CHLORIDE 0.9 % (FLUSH) 0.9 %
10 SYRINGE (ML) INJECTION AS NEEDED
Status: DISCONTINUED | OUTPATIENT
Start: 2022-01-02 | End: 2022-01-09 | Stop reason: HOSPADM

## 2022-01-02 RX ORDER — MORPHINE SULFATE 2 MG/ML
2 INJECTION, SOLUTION INTRAMUSCULAR; INTRAVENOUS EVERY 4 HOURS PRN
Status: DISCONTINUED | OUTPATIENT
Start: 2022-01-02 | End: 2022-01-03

## 2022-01-02 RX ORDER — BENZONATATE 100 MG/1
100 CAPSULE ORAL 3 TIMES DAILY PRN
Status: DISCONTINUED | OUTPATIENT
Start: 2022-01-02 | End: 2022-01-09 | Stop reason: HOSPADM

## 2022-01-02 RX ORDER — OXYCODONE HYDROCHLORIDE 5 MG/1
10 TABLET ORAL EVERY 4 HOURS PRN
Status: DISCONTINUED | OUTPATIENT
Start: 2022-01-02 | End: 2022-01-09 | Stop reason: HOSPADM

## 2022-01-02 RX ORDER — SODIUM CHLORIDE 9 MG/ML
100 INJECTION, SOLUTION INTRAVENOUS CONTINUOUS
Status: DISCONTINUED | OUTPATIENT
Start: 2022-01-02 | End: 2022-01-03

## 2022-01-02 RX ORDER — ACETAMINOPHEN 500 MG
1000 TABLET ORAL 3 TIMES DAILY
Status: DISCONTINUED | OUTPATIENT
Start: 2022-01-02 | End: 2022-01-09 | Stop reason: HOSPADM

## 2022-01-02 RX ORDER — LIDOCAINE 50 MG/G
1 PATCH TOPICAL
Status: DISCONTINUED | OUTPATIENT
Start: 2022-01-02 | End: 2022-01-09 | Stop reason: HOSPADM

## 2022-01-02 RX ORDER — OXYCODONE HYDROCHLORIDE 5 MG/1
5 TABLET ORAL EVERY 4 HOURS PRN
Status: DISCONTINUED | OUTPATIENT
Start: 2022-01-02 | End: 2022-01-09 | Stop reason: HOSPADM

## 2022-01-02 RX ORDER — CHOLECALCIFEROL (VITAMIN D3) 125 MCG
5 CAPSULE ORAL NIGHTLY PRN
Status: DISCONTINUED | OUTPATIENT
Start: 2022-01-02 | End: 2022-01-09 | Stop reason: HOSPADM

## 2022-01-02 RX ADMIN — MORPHINE SULFATE 2 MG: 2 INJECTION, SOLUTION INTRAMUSCULAR; INTRAVENOUS at 17:23

## 2022-01-02 RX ADMIN — SENNOSIDES AND DOCUSATE SODIUM 2 TABLET: 50; 8.6 TABLET ORAL at 20:36

## 2022-01-02 RX ADMIN — MORPHINE SULFATE 2 MG: 2 INJECTION, SOLUTION INTRAMUSCULAR; INTRAVENOUS at 22:14

## 2022-01-02 RX ADMIN — MORPHINE SULFATE 4 MG: 4 INJECTION INTRAVENOUS at 12:41

## 2022-01-02 RX ADMIN — LIDOCAINE 1 PATCH: 50 PATCH CUTANEOUS at 20:34

## 2022-01-02 RX ADMIN — SODIUM CHLORIDE 100 ML/HR: 9 INJECTION, SOLUTION INTRAVENOUS at 20:37

## 2022-01-02 RX ADMIN — ACETAMINOPHEN 1000 MG: 500 TABLET, FILM COATED ORAL at 20:36

## 2022-01-02 RX ADMIN — OXYCODONE HYDROCHLORIDE 5 MG: 5 TABLET ORAL at 20:30

## 2022-01-02 RX ADMIN — SODIUM CHLORIDE, PRESERVATIVE FREE 10 ML: 5 INJECTION INTRAVENOUS at 17:23

## 2022-01-02 RX ADMIN — FAMOTIDINE 40 MG: 20 TABLET ORAL at 17:23

## 2022-01-02 NOTE — ED PROVIDER NOTES
Time: 12:20 EST  Arrived by: ambulance  Chief Complaint: fall  History provided by: patient  History is limited by: N/A    History of Present Illness:  Patient is a 89 y.o. year old female with a history of CAD that presents to the emergency department for FALL. This occurred this morning about 2 hours ago.    Per pt, she had just taken a bath and was sitting on the toilet clipping her toe nails when she broke out in a sweat and tried to walk to her bed room and fell into a wall. No positive LOC. She complains of neck pain but states she already had this pain prior to her fall. She also complains of hip pain.     Pt denies being anticoagulated.         Fall  Mechanism of injury: fall    Injury location:  Head/neck and pelvis  Pelvic injury location: hip.  Incident location:  Home  Time since incident:  2 hours  Fall:     Fall occurred: fell against door.    Impact surface: door.  Associated symptoms: neck pain    Associated symptoms: no abdominal pain, no chest pain, no headaches, no loss of consciousness, no nausea, no seizures and no vomiting    Risk factors: CAD        Patient Care Team  Primary Care Provider: Provider, No Known      Past Medical History:     Allergies   Allergen Reactions   • Meloxicam Dizziness   • Codeine Nausea And Vomiting     Past Medical History:   Diagnosis Date   • Anxiety    • CAD 10/20/2021   • Cancer (HCC)    • Essential hypertension 10/20/2021   • Hyperlipidemia LDL goal <70 10/20/2021   • Pneumonia      History reviewed. No pertinent surgical history.  History reviewed. No pertinent family history.    Home Medications:  Prior to Admission medications    Medication Sig Start Date End Date Taking? Authorizing Provider   acetaminophen (TYLENOL) 650 MG 8 hr tablet acetaminophen 650 mg oral tablet extended release take 1 tablets (650 mg) by oral route every 8 hours swallowing whole with water. Do not break, crush, dissolve and/or chew.   Active    Provider, Historical, MD   aspirin  "(aspirin) 81 MG EC tablet Aspir-81 81 mg oral tablet,delayed release (DR/EC) take 1 tablet (81 mg) by oral route once daily   Active    Provider, MD Ruby   cetirizine (zyrTEC) 10 MG tablet cetirizine 10 mg oral tablet take 1 tablet (10 mg) by oral route once daily   Suspended    ProviderRuby MD   hydroCHLOROthiazide (HYDRODIURIL) 25 MG tablet Take 1 tablet by mouth Daily. 10/27/21   Andrés Curiel MD   metoprolol succinate XL (TOPROL-XL) 25 MG 24 hr tablet Take 1 tablet by mouth Daily. 10/27/21   Andrés Curiel MD   Rhopressa 0.02 % solution instill 1 DROP IN EACH EYE EVERY NIGHT AT BEDTIME 7/27/21   ProviderRuby MD   telmisartan (MICARDIS) 80 MG tablet Take 2 tablets by mouth Daily. Or as directed. 10/27/21   Andrés Curiel MD        Social History:   PT  reports that she has never smoked. She has never used smokeless tobacco. She reports that she does not drink alcohol and does not use drugs.    Record Review:  I have reviewed the patient's records in [id number].     Review of Systems  Review of Systems   Constitutional: Negative for chills and fever.   HENT: Negative for congestion, rhinorrhea and sore throat.    Eyes: Negative for pain and visual disturbance.   Respiratory: Negative for apnea, cough, chest tightness and shortness of breath.    Cardiovascular: Negative for chest pain and palpitations.   Gastrointestinal: Negative for abdominal pain, diarrhea, nausea and vomiting.   Genitourinary: Negative for difficulty urinating and dysuria.   Musculoskeletal: Positive for neck pain.        Hip pain   Skin: Negative for color change.   Neurological: Negative for seizures, loss of consciousness and headaches.   Psychiatric/Behavioral: Negative.    All other systems reviewed and are negative.       Physical Exam  /74 (BP Location: Right arm, Patient Position: Lying)   Pulse 101   Temp 98.9 °F (37.2 °C) (Axillary)   Resp 22   Ht 160 cm (63\")   Wt 59.3 kg (130 lb 11.7 oz)   SpO2 98%   " "BMI 23.16 kg/m²     Physical Exam  Vitals and nursing note reviewed.   Constitutional:       General: She is not in acute distress.     Appearance: Normal appearance. She is not toxic-appearing.   HENT:      Head: Normocephalic.      Jaw: There is normal jaw occlusion.      Comments: Superficial 2 cm laceration to forehead  Eyes:      General: Lids are normal.      Extraocular Movements: Extraocular movements intact.      Conjunctiva/sclera: Conjunctivae normal.      Pupils: Pupils are equal, round, and reactive to light.   Cardiovascular:      Rate and Rhythm: Normal rate and regular rhythm.      Pulses: Normal pulses.      Heart sounds: Normal heart sounds.   Pulmonary:      Effort: Pulmonary effort is normal. No respiratory distress.      Breath sounds: Normal breath sounds. No wheezing or rhonchi.   Abdominal:      General: Abdomen is flat.      Palpations: Abdomen is soft.      Tenderness: There is no abdominal tenderness. There is no guarding or rebound.   Musculoskeletal:         General: Normal range of motion.      Cervical back: Normal range of motion and neck supple. Tenderness present.      Right lower leg: No edema.      Left lower leg: No edema.      Comments: Left hip tenderness   Skin:     General: Skin is warm and dry.   Neurological:      Mental Status: She is alert and oriented to person, place, and time. Mental status is at baseline.   Psychiatric:         Mood and Affect: Mood normal.                  ED Course  /74 (BP Location: Right arm, Patient Position: Lying)   Pulse 101   Temp 98.9 °F (37.2 °C) (Axillary)   Resp 22   Ht 160 cm (63\")   Wt 59.3 kg (130 lb 11.7 oz)   SpO2 98%   BMI 23.16 kg/m²   Results for orders placed or performed during the hospital encounter of 01/02/22   CK    Specimen: Arm, Left; Blood   Result Value Ref Range    Creatine Kinase 171 20 - 180 U/L   Comprehensive Metabolic Panel    Specimen: Arm, Left; Blood   Result Value Ref Range    Glucose 109 (H) 65 - " 99 mg/dL    BUN 29 (H) 8 - 23 mg/dL    Creatinine 1.03 (H) 0.57 - 1.00 mg/dL    Sodium 134 (L) 136 - 145 mmol/L    Potassium 3.9 3.5 - 5.2 mmol/L    Chloride 95 (L) 98 - 107 mmol/L    CO2 26.4 22.0 - 29.0 mmol/L    Calcium 9.5 8.6 - 10.5 mg/dL    Total Protein 7.8 6.0 - 8.5 g/dL    Albumin 4.30 3.50 - 5.20 g/dL    ALT (SGPT) 13 1 - 33 U/L    AST (SGOT) 28 1 - 32 U/L    Alkaline Phosphatase 82 39 - 117 U/L    Total Bilirubin 0.6 0.0 - 1.2 mg/dL    eGFR Non African Amer 50 (L) >60 mL/min/1.73    Globulin 3.5 gm/dL    A/G Ratio 1.2 g/dL    BUN/Creatinine Ratio 28.2 (H) 7.0 - 25.0    Anion Gap 12.6 5.0 - 15.0 mmol/L   Magnesium    Specimen: Arm, Left; Blood   Result Value Ref Range    Magnesium 2.3 1.6 - 2.4 mg/dL   T4, Free    Specimen: Arm, Left; Blood   Result Value Ref Range    Free T4 1.59 0.93 - 1.70 ng/dL   TSH    Specimen: Arm, Left; Blood   Result Value Ref Range    TSH 2.530 0.270 - 4.200 uIU/mL   CBC Auto Differential    Specimen: Arm, Left; Blood   Result Value Ref Range    WBC 18.05 (H) 3.40 - 10.80 10*3/mm3    RBC 3.55 (L) 3.77 - 5.28 10*6/mm3    Hemoglobin 11.0 (L) 12.0 - 15.9 g/dL    Hematocrit 33.1 (L) 34.0 - 46.6 %    MCV 93.2 79.0 - 97.0 fL    MCH 31.0 26.6 - 33.0 pg    MCHC 33.2 31.5 - 35.7 g/dL    RDW 12.6 12.3 - 15.4 %    RDW-SD 43.2 37.0 - 54.0 fl    MPV 9.6 6.0 - 12.0 fL    Platelets 314 140 - 450 10*3/mm3    Neutrophil % 86.7 (H) 42.7 - 76.0 %    Lymphocyte % 7.0 (L) 19.6 - 45.3 %    Monocyte % 4.9 (L) 5.0 - 12.0 %    Eosinophil % 0.2 (L) 0.3 - 6.2 %    Basophil % 0.3 0.0 - 1.5 %    Immature Grans % 0.9 (H) 0.0 - 0.5 %    Neutrophils, Absolute 15.62 (H) 1.70 - 7.00 10*3/mm3    Lymphocytes, Absolute 1.27 0.70 - 3.10 10*3/mm3    Monocytes, Absolute 0.89 0.10 - 0.90 10*3/mm3    Eosinophils, Absolute 0.04 0.00 - 0.40 10*3/mm3    Basophils, Absolute 0.06 0.00 - 0.20 10*3/mm3    Immature Grans, Absolute 0.17 (H) 0.00 - 0.05 10*3/mm3    nRBC 0.0 0.0 - 0.2 /100 WBC   Troponin    Specimen: Arm, Left;  Blood   Result Value Ref Range    Troponin T <0.010 0.000 - 0.030 ng/mL   Urinalysis With Culture If Indicated - Urine, Catheter In/Out    Specimen: Urine, Catheter In/Out   Result Value Ref Range    Color, UA Yellow Yellow, Straw    Appearance, UA Clear Clear    pH, UA 7.5 5.0 - 8.0    Specific Gravity, UA 1.020 1.005 - 1.030    Glucose, UA Negative Negative    Ketones, UA 15 mg/dL (1+) (A) Negative    Bilirubin, UA Negative Negative    Blood, UA Negative Negative    Protein, UA 30 mg/dL (1+) (A) Negative    Leuk Esterase, UA Negative Negative    Nitrite, UA Negative Negative    Urobilinogen, UA 0.2 E.U./dL 0.2 - 1.0 E.U./dL   TSH Rfx On Abnormal To Free T4    Specimen: Arm, Left; Blood   Result Value Ref Range    TSH 2.590 0.270 - 4.200 uIU/mL   ECG 12 Lead   Result Value Ref Range    QT Interval 346 ms     Medications   nitroglycerin (NITROSTAT) SL tablet 0.4 mg (has no administration in time range)   sodium chloride 0.9 % flush 10 mL (has no administration in time range)   sodium chloride 0.9 % flush 10 mL (10 mL Intravenous Given 1/2/22 1723)   sodium chloride 0.9 % infusion (has no administration in time range)   acetaminophen (TYLENOL) tablet 1,000 mg (has no administration in time range)   lidocaine (LIDODERM) 5 % 1 patch (has no administration in time range)   oxyCODONE (ROXICODONE) immediate release tablet 5 mg (has no administration in time range)   morphine injection 2 mg (2 mg Intravenous Given 1/2/22 1723)   famotidine (PEPCID) tablet 40 mg (40 mg Oral Given 1/2/22 1723)   melatonin tablet 5 mg (has no administration in time range)   enoxaparin (LOVENOX) syringe 40 mg (has no administration in time range)   sennosides-docusate (PERICOLACE) 8.6-50 MG per tablet 2 tablet (has no administration in time range)     And   polyethylene glycol (MIRALAX) packet 17 g (has no administration in time range)     And   bisacodyl (DULCOLAX) EC tablet 5 mg (has no administration in time range)     And   bisacodyl  (DULCOLAX) suppository 10 mg (has no administration in time range)   oxyCODONE (ROXICODONE) immediate release tablet 10 mg (has no administration in time range)   benzonatate (TESSALON) capsule 100 mg (has no administration in time range)   morphine injection 4 mg (4 mg Intravenous Given 1/2/22 1241)     XR Spine Lumbar 2 or 3 View    Result Date: 1/2/2022  Narrative: PROCEDURE: XR SPINE LUMBAR 2 OR 3 VW  COMPARISON: None  INDICATIONS: FELL COMPLAINS OF LOWER BACK PAIN  FINDINGS:  Is mild osteopenia.  There is degenerative disc space narrowing throughout the entire lumbar spine.  On the AP view there is dextro convex scoliosis of the lumbar spine centered near the L4 level.  No vertebral body anomaly identified.  Thoracolumbar junction appears within normal limits.  There is atherosclerotic vascular calcifications throughout the aorta.  Sacroiliac joints appear intact.  CONCLUSION:  1. Osteopenia and degenerative disc disease throughout the lumbar spine.  No acute bony abnormality.     ALONZO CLAUDIO MD       Electronically Signed and Approved By: ALONZO CLAUDIO MD on 1/02/2022 at 13:13             CT Head Without Contrast    Result Date: 1/2/2022  Narrative: PROCEDURE: CT HEAD WO CONTRAST  COMPARISON:  [facility], CT, HEAD W/O CONTRAST, 9/08/2020, 16:23. INDICATIONS: trauma  PROTOCOL:   Standard imaging protocol performed    RADIATION:   DLP: 1346.5mGy*cm   MA and/or KV was adjusted to minimize radiation dose.     TECHNIQUE: After obtaining the patient's consent, CT images were obtained without non-ionic intravenous contrast material.  FINDINGS:  There is mild generalized parenchymal volume loss.  There is no evidence of acute infarct or hemorrhage.  There is patchy low attenuation within the periventricular white matter bilaterally compatible changes of chronic small vessel ischemic disease.  There is partial opacification of bilateral frontal sinuses.  Other paranasal sinuses and mastoid air cells  are clear.  No acute skull fracture identified.  Globes and orbits are within normal limits.  CONCLUSION:  1. Mild generalized parenchymal volume loss and changes of chronic small vessel ischemic disease. 2. No acute intracranial abnormality.  No acute skull fracture.     ALONZO CLAUDIO MD       Electronically Signed and Approved By: ALONZO CLAUDIO MD on 1/02/2022 at 13:16             CT Cervical Spine Without Contrast    Result Date: 1/2/2022  Narrative: PROCEDURE: CT CERVICAL SPINE WO CONTRAST   6 mm noncalcified COMPARISON: [facility], CT, CT CHEST W/ CONTRAST, 2/23/2009, 9:10.  INDICATIONS: neck pain  PROTOCOL:   Standard imaging protocol performed    RADIATION:      MA and/or KV was adjusted to minimize radiation dose.     TECHNIQUE: After obtaining the patient's consent, multi-planar CT images were created without contrast material.   FINDINGS:  There is normal height and alignment of the cervical vertebral bodies.  No acute fracture is seen.  Craniovertebral junction appears within normal limits.  There is degenerative disc space narrowing throughout the entire cervical spine.  No significant spinal canal stenosis identified.  Facet joints are intact.  There are degenerative changes throughout the cervical spine with only mild bilateral neural foraminal narrowing at C4/5-C5/6 levels.  The unenhanced soft tissues are within normal limits.  There are subcentimeter noncalcified nodules in both lung apices, unchanged from 2009.  CONCLUSION:  1. No acute cervical spine fracture or malalignment.     ALONZO CLAUDIO MD       Electronically Signed and Approved By: ALONZO CLAUDIO MD on 1/02/2022 at 13:22             XR Chest 1 View    Result Date: 1/2/2022  Narrative: PROCEDURE: XR CHEST 1 VW  COMPARISON: [facility], CR, XR HIP W OR WO PELVIS 2-3 VIEW LEFT, 1/02/2022, 12:11.  [facility], CR, CHEST PA/AP & LAT 2V, 4/06/2018, 12:46.  [facility], CR, CHEST PA/AP & LAT 2V, 6/25/2019,  11:03.  [facility], CT, CT CERVICAL SPINE WO CONTRAST, 1/02/2022, 13:03.  [facility], CR, CHEST PA/AP & LAT 2V, 9/08/2020, 11:42.  INDICATIONS: cough, leukocytosis  FINDINGS:  Heart size and pulmonary vessels are within normal limits.  There is a 2.3 cm nodule within the lateral aspect of the mid right lung.  No other definite pulmonary nodule identified.  There are coarsened interstitial markings throughout both lungs.  No focal airspace consolidation.  No pleural effusion or pneumothorax.  There is a questionable lytic lesion within the proximal right humerus, incompletely imaged on this exam.  There is minimal dextro convex scoliosis of the upper thoracic spine.  CONCLUSION:  1. New 2.3 cm nodule within the lateral aspect of the right mid lung.  CT scan of the chest with IV contrast would be useful for further evaluation when clinically feasible. 2. Questionable lytic lesion within the proximal right humerus.  This may be included within the field of view on future chest CT.  Dedicated radiographs of the right shoulder may also be useful for further evaluation.       ALONZO CLAUDIO MD       Electronically Signed and Approved By: ALONZO CLAUDIO MD on 1/02/2022 at 17:38             XR Hip With or Without Pelvis 2 - 3 View Left    Result Date: 1/2/2022  Narrative: PROCEDURE: XR HIP W OR WO PELVIS 2-3 VIEW LEFT  COMPARISON: None  INDICATIONS: FALL  FINDINGS:  There are acute mildly displaced fractures of the left superior and inferior pubic rami.  Fracture lines do not appear to involve the acetabulum.  Sacroiliac joints appear intact.  There is mild narrowing of the hip joint spaces bilaterally.  No other acute fractures are seen.  There are degenerative changes of the lower lumbar spine.  CONCLUSION:  1. Acute fractures of the left superior and inferior pubic rami 2. Minimal degenerative change of the hip joints bilaterally.      ALONZO CLAUDIO MD       Electronically Signed and Approved By: ALONZO  MD SHANON on 1/02/2022 at 12:41               Procedures/EKGs:  Procedures    EKG:    Rhythm: sinus  Rate: 104  Axis: normal  Intervals: normal  ST Segment: Depression in V4,V5        Interpreted by me        Medical Decision Making:                         MDM  Number of Diagnoses or Management Options  Closed nondisplaced fracture of pelvis, unspecified part of pelvis, sequela  Diagnosis management comments: The patient´s CBC was reviewed and shows no abnormalities of critical concern. Of note, there is no anemia requiring a blood transfusion and the platelet count is acceptable.  The patient´s CMP was reviewed and shows no abnormalities of critical concern. Of note, the patient´s sodium and potassium are acceptable. The patient´s liver enzymes are unremarkable. The patient´s renal function (creatinine) is preserved. The patient has a normal anion gap.  Urinalysis is negative for bacteriuria.  Troponin is negative.  CT of the head is negative for fracture and intracranial bleed.  X-ray of the cervical spine is negative for fracture.  X-ray of the pelvis and hip shows acute superior and inferior pubic rami fractures.  X-ray of the lumbar spine is negative for fracture.  Case was discussed with Dr. Mary López who agrees with admission.       Amount and/or Complexity of Data Reviewed  Clinical lab tests: reviewed  Tests in the radiology section of CPT®: reviewed  Tests in the medicine section of CPT®: reviewed  Discussion of test results with the performing providers: yes  Discuss the patient with other providers: yes  Independent visualization of images, tracings, or specimens: yes    Risk of Complications, Morbidity, and/or Mortality  Presenting problems: moderate  Management options: moderate    Patient Progress  Patient progress: stable       Final diagnoses:   Closed nondisplaced fracture of pelvis, unspecified part of pelvis, sequela          Disposition:  ED Disposition     ED Disposition Condition Comment     Decision to Admit  Level of Care: Remote Telemetry [26]   Diagnosis: Pelvic fracture (HCC) [301010]   Certification: I Certify That Inpatient Hospital Services Are Medically Necessary For Greater Than 2 Midnights            Documentation assistance provided by Sheree Ross acting as scribe for Santo Nye MD. Information recorded by the scribe was done at my direction and has been verified and validated by me.        Sheree Ross  01/02/22 1228       Santo Nye MD  01/02/22 6816

## 2022-01-02 NOTE — H&P
[facility]  [facility]   Patient Name: Sarina Trevino  : 1932  MRN: [id number]  Primary Care Physician:  Provider, No Known  Date of admission: 2022    Subjective   Subjective     Chief Complaint: I passed out  HPI:    Sarina Trevino is a 89 y.o. female with past medical history significant for hypertension, coronary disease, dyslipidemia who reports that today after she had taken a bath she was sitting on her toilet clipping her toenails.  She states that she began having pain in her left hip and suddenly became diaphoretic.  She reports that she got up off the toilet to walk to her bedroom by the time she got to the bed from door she passed out and fell to the floor.  She states she is not sure how long she was actually out.  She reports that she was unable to get up off the floor after the fall.  She reports that she eventually was able to reach a nearby phone and called her sister.  Her sister was unable to get her up out of the floor and subsequently EMS was called and she was brought to the emergency department.  In the ED she was found to have a left superior and inferior pelvic rami fracture.  Patient denies any chest pain, shortness of breath, palpitations, no nausea/vomiting, lightheadedness or dizziness surrounding her syncopal event.  She does report a recent cough which is been nonproductive.  She states that she has been on antibiotics within the past 2 months for pneumonia.  She denies any fever or chills.    Personal History     Past Medical History:  Hypertension  Coronary artery disease  Dyslipidemia  Osteoarthritis    Past Surgical History:  Heart catheterization    Family History:   Hypertension    Social History:   Patient lives alone.  She denies tobacco EtOH or illicit drug use.    Home Medications:  acetaminophen, aspirin, hydroCHLOROthiazide, metoprolol succinate XL, and telmisartan    Allergies:  Allergies   Allergen Reactions   •  Meloxicam Dizziness   • Codeine Nausea And Vomiting       Review of Systems   All systems were reviewed and negative except for: As noted per HPI    Objective   Objective     Vitals:   Temp:  [97.8 °F (36.6 °C)] 97.8 °F (36.6 °C)  Heart Rate:  [] 100  Resp:  [21] 21  BP: (156)/(79) 156/79    Physical Exam    Constitutional: Awake, alert, no acute distress   Eyes: Pupils equal and round reactive to light, sclerae anicteric, no conjunctival injection   HENT: NCAT, mucous membranes moist   Neck: Supple, no thyromegaly, no lymphadenopathy, trachea midline   Respiratory: Clear to auscultation bilaterally, nonlabored respirations    Cardiovascular: Tachycardic S1-S2, no appreciable murmurs, rubs, or gallops, palpable pedal pulses bilaterally   Gastrointestinal: Positive bowel sounds, soft, nontender, nondistended   Musculoskeletal: No bilateral ankle edema, no clubbing or cyanosis to extremities   Psychiatric: Appropriate affect, cooperative   Neurologic: Oriented x 3, moving all extremities although bilateral extremity movement limited secondary to pain, Cranial Nerves grossly intact to confrontation, speech clear   Skin: No rashes     Result Review    Result Review:  I have personally reviewed the results from the time of this admission to 1/2/2022 16:29 EST and agree with these findings:  [x]  Laboratory  []  Microbiology  [x]  Radiology  [x]  EKG/Telemetry: Sinus tachycardia rate of 104 R wave progression noted in the anterior leads.  LVH with repolarization.  []  Cardiology/Vascular   []  Pathology  [x]  Old records  []  Other:      Assessment/Plan   Assessment / Plan     Assessment:   • Syncope.  Most likely vasovagal in nature.  • Pelvic fracture status post fall  • Systemic inflammatory response syndrome.(With tachycardia, leukocytosis)  • Hypertension.  Blood pressure is currently low normal with systolic blood pressures noted to be in the 110s at the time of my evaluation    Plan:  Admit to the hospitalist  service to telemetry monitored bed.  Obtain 2D echo in the a.m. for further evaluation of syncopal episode.  Trend cardiac enzymes.  Initiate pain management with Tylenol/oxycodone/morphine  PT and OT eval in the a.m.  Case management consult for rehab placement.  Initiate IV fluids with normal saline at 100 cc/h.   Check UA and chest x-ray given patient's leukocytosis and tachycardia.  Home medications will be reviewed and resumed as clinically indicated.  Oral antihypertensives will be resumed with parameters although her hydrochlorothiazide will be held at this time.      DVT prophylaxis:  Medical DVT prophylaxis orders are present.    CODE STATUS:    Level Of Support Discussed With: Patient  Code Status (Patient has no pulse and is not breathing): No CPR (Do Not Attempt to Resuscitate)  Medical Interventions (Patient has pulse or is breathing): Full Support      Admission Status:  I believe this patient meets inpatient status.    Electronically signed by Juan Burton MD, 01/02/22, 4:29 PM EST.

## 2022-01-02 NOTE — ED PROVIDER NOTES
Subjective   History of Present Illness    Review of Systems    Past Medical History:   Diagnosis Date   • Anxiety    • CAD 10/20/2021   • Cancer (HCC)    • Essential hypertension 10/20/2021   • Hyperlipidemia LDL goal <70 10/20/2021   • Pneumonia        Allergies   Allergen Reactions   • Meloxicam Dizziness   • Codeine Nausea And Vomiting       History reviewed. No pertinent surgical history.    History reviewed. No pertinent family history.    Social History     Socioeconomic History   • Marital status:    Tobacco Use   • Smoking status: Never Smoker   • Smokeless tobacco: Never Used   Vaping Use   • Vaping Use: Never used   Substance and Sexual Activity   • Alcohol use: Never   • Drug use: Never   • Sexual activity: Not Currently           Objective   Physical Exam    Laceration Repair    Date/Time: 1/2/2022 1:16 PM  Performed by: Orquidea Umana PA-C  Authorized by: Santo Nye MD     Consent:     Consent obtained:  Verbal    Consent given by:  Patient    Risks discussed:  Pain  Anesthesia (see MAR for exact dosages):     Anesthesia method:  None  Laceration details:     Location:  Face    Face location:  Forehead    Length (cm):  5  Repair type:     Repair type:  Simple  Pre-procedure details:     Preparation:  Patient was prepped and draped in usual sterile fashion  Exploration:     Hemostasis achieved with:  Direct pressure    Wound exploration: wound explored through full range of motion and entire depth of wound probed and visualized      Wound extent: no areolar tissue violation noted, no fascia violation noted, no foreign bodies/material noted, no muscle damage noted, no nerve damage noted, no tendon damage noted, no underlying fracture noted and no vascular damage noted      Contaminated: no    Treatment:     Area cleansed with:  Betadine and saline    Amount of cleaning:  Standard    Visualized foreign bodies/material removed: no    Skin repair:     Repair method:  Tissue  adhesive  Approximation:     Approximation:  Close  Post-procedure details:     Dressing:  Open (no dressing)    Patient tolerance of procedure:  Tolerated well, no immediate complications               ED Course                                                 MDM    Final diagnoses:   None       ED Disposition  ED Disposition     None          No follow-up provider specified.       Medication List      No changes were made to your prescriptions during this visit.          Orquidea Umana PA-C  01/02/22 [id number]

## 2022-01-03 PROBLEM — M25.552 LEFT HIP PAIN: Status: ACTIVE | Noted: 2022-01-03

## 2022-01-03 PROBLEM — M47.816 DEGENERATIVE ARTHRITIS OF LUMBAR SPINE: Chronic | Status: ACTIVE | Noted: 2022-01-03

## 2022-01-03 PROBLEM — M85.80 OSTEOPENIA: Chronic | Status: ACTIVE | Noted: 2022-01-03

## 2022-01-03 PROBLEM — R55 SYNCOPE: Status: ACTIVE | Noted: 2022-01-03

## 2022-01-03 PROBLEM — E87.6 HYPOKALEMIA: Status: ACTIVE | Noted: 2022-01-03

## 2022-01-03 LAB
ANION GAP SERPL CALCULATED.3IONS-SCNC: 10.1 MMOL/L (ref 5–15)
BUN SERPL-MCNC: 33 MG/DL (ref 8–23)
BUN/CREAT SERPL: 30.6 (ref 7–25)
CALCIUM SPEC-SCNC: 8.2 MG/DL (ref 8.6–10.5)
CHLORIDE SERPL-SCNC: 100 MMOL/L (ref 98–107)
CO2 SERPL-SCNC: 25.9 MMOL/L (ref 22–29)
CREAT SERPL-MCNC: 1.08 MG/DL (ref 0.57–1)
GFR SERPL CREATININE-BSD FRML MDRD: 48 ML/MIN/1.73
GLUCOSE SERPL-MCNC: 107 MG/DL (ref 65–99)
MAGNESIUM SERPL-MCNC: 2.2 MG/DL (ref 1.6–2.4)
PHOSPHATE SERPL-MCNC: 4.3 MG/DL (ref 2.5–4.5)
POTASSIUM SERPL-SCNC: 3.3 MMOL/L (ref 3.5–5.2)
QT INTERVAL: 346 MS
SODIUM SERPL-SCNC: 136 MMOL/L (ref 136–145)
TROPONIN T SERPL-MCNC: 0.02 NG/ML (ref 0–0.03)

## 2022-01-03 PROCEDURE — 25010000002 ENOXAPARIN PER 10 MG: Performed by: INTERNAL MEDICINE

## 2022-01-03 PROCEDURE — 84484 ASSAY OF TROPONIN QUANT: CPT | Performed by: INTERNAL MEDICINE

## 2022-01-03 PROCEDURE — 83735 ASSAY OF MAGNESIUM: CPT | Performed by: INTERNAL MEDICINE

## 2022-01-03 PROCEDURE — 99233 SBSQ HOSP IP/OBS HIGH 50: CPT | Performed by: INTERNAL MEDICINE

## 2022-01-03 PROCEDURE — 84100 ASSAY OF PHOSPHORUS: CPT | Performed by: INTERNAL MEDICINE

## 2022-01-03 PROCEDURE — 97530 THERAPEUTIC ACTIVITIES: CPT

## 2022-01-03 PROCEDURE — 80048 BASIC METABOLIC PNL TOTAL CA: CPT | Performed by: INTERNAL MEDICINE

## 2022-01-03 PROCEDURE — 97161 PT EVAL LOW COMPLEX 20 MIN: CPT

## 2022-01-03 PROCEDURE — 25010000002 ONDANSETRON PER 1 MG: Performed by: PHYSICIAN ASSISTANT

## 2022-01-03 RX ORDER — MORPHINE SULFATE 2 MG/ML
2 INJECTION, SOLUTION INTRAMUSCULAR; INTRAVENOUS
Status: DISCONTINUED | OUTPATIENT
Start: 2022-01-03 | End: 2022-01-08

## 2022-01-03 RX ORDER — METOPROLOL SUCCINATE 25 MG/1
25 TABLET, EXTENDED RELEASE ORAL
Status: DISCONTINUED | OUTPATIENT
Start: 2022-01-03 | End: 2022-01-09 | Stop reason: HOSPADM

## 2022-01-03 RX ORDER — POTASSIUM CHLORIDE 750 MG/1
40 CAPSULE, EXTENDED RELEASE ORAL ONCE
Status: COMPLETED | OUTPATIENT
Start: 2022-01-03 | End: 2022-01-03

## 2022-01-03 RX ORDER — ONDANSETRON 2 MG/ML
4 INJECTION INTRAMUSCULAR; INTRAVENOUS EVERY 4 HOURS PRN
Status: DISCONTINUED | OUTPATIENT
Start: 2022-01-03 | End: 2022-01-09 | Stop reason: HOSPADM

## 2022-01-03 RX ORDER — SIMETHICONE 80 MG
80 TABLET,CHEWABLE ORAL 4 TIMES DAILY PRN
Status: DISCONTINUED | OUTPATIENT
Start: 2022-01-03 | End: 2022-01-09 | Stop reason: HOSPADM

## 2022-01-03 RX ADMIN — ACETAMINOPHEN 1000 MG: 500 TABLET, FILM COATED ORAL at 16:05

## 2022-01-03 RX ADMIN — SIMETHICONE 80 MG: 80 TABLET, CHEWABLE ORAL at 13:03

## 2022-01-03 RX ADMIN — OXYCODONE HYDROCHLORIDE 10 MG: 5 TABLET ORAL at 12:35

## 2022-01-03 RX ADMIN — ACETAMINOPHEN 1000 MG: 500 TABLET, FILM COATED ORAL at 20:14

## 2022-01-03 RX ADMIN — ENOXAPARIN SODIUM 40 MG: 40 INJECTION SUBCUTANEOUS at 08:17

## 2022-01-03 RX ADMIN — ACETAMINOPHEN 1000 MG: 500 TABLET, FILM COATED ORAL at 08:17

## 2022-01-03 RX ADMIN — SIMETHICONE 80 MG: 80 TABLET, CHEWABLE ORAL at 19:28

## 2022-01-03 RX ADMIN — ONDANSETRON 4 MG: 2 INJECTION INTRAMUSCULAR; INTRAVENOUS at 19:28

## 2022-01-03 RX ADMIN — SODIUM CHLORIDE 100 ML/HR: 9 INJECTION, SOLUTION INTRAVENOUS at 06:08

## 2022-01-03 RX ADMIN — OXYCODONE HYDROCHLORIDE 5 MG: 5 TABLET ORAL at 02:20

## 2022-01-03 RX ADMIN — LIDOCAINE 1 PATCH: 50 PATCH CUTANEOUS at 08:19

## 2022-01-03 RX ADMIN — Medication 5 MG: at 20:14

## 2022-01-03 RX ADMIN — SENNOSIDES AND DOCUSATE SODIUM 2 TABLET: 50; 8.6 TABLET ORAL at 20:14

## 2022-01-03 RX ADMIN — ONDANSETRON 4 MG: 2 INJECTION INTRAMUSCULAR; INTRAVENOUS at 06:31

## 2022-01-03 RX ADMIN — METOPROLOL SUCCINATE 25 MG: 25 TABLET, FILM COATED, EXTENDED RELEASE ORAL at 12:35

## 2022-01-03 RX ADMIN — POTASSIUM CHLORIDE 40 MEQ: 750 CAPSULE, EXTENDED RELEASE ORAL at 08:17

## 2022-01-03 RX ADMIN — SENNOSIDES AND DOCUSATE SODIUM 2 TABLET: 50; 8.6 TABLET ORAL at 08:17

## 2022-01-03 RX ADMIN — OXYCODONE HYDROCHLORIDE 5 MG: 5 TABLET ORAL at 19:28

## 2022-01-03 RX ADMIN — FAMOTIDINE 40 MG: 20 TABLET ORAL at 08:17

## 2022-01-03 NOTE — PLAN OF CARE
Goal Outcome Evaluation:      Pt has had no new changes throughout shift, and remains stable. Pt has had complaints of nausea throughout the day. Nausea has been controlled with PRN Zofran. Pt has had complaints of pain as well that start in left hip and radiates to her back. Pt has PRN norco that has been given to control her pain. Pt's family came to visit her today. When discharging, pt will need a DME order placed for a 3 in 1.

## 2022-01-03 NOTE — PLAN OF CARE
Goal Outcome Evaluation:   Patient arrived to room 229 and was transferred to bed without incident.  Multiple staff utilized to maintain positioning and safety during transfer to Patient's bed.  Patient tolerated relatively well.

## 2022-01-03 NOTE — PROGRESS NOTES
[facility]   Hospitalist Progress Note  Date: 1/3/2022  Patient Name: Sarina Trevino  : 1932  MRN: [id number]  Date of admission: 2022      Subjective   Subjective     Chief Complaint: follow up for left hip pain    Summary: 88 y/o F with CAD, HTN who presented presented following syncopal event after getting up from a seated position and experiencing severe pain in the left hip. Found to have acute fractures of the left superior and inferior pubic rami.     Interval Followup: No acute events overnight.  Blood pressure well controlled.  Lying in bed this morning.  Alert and conversant.  She is complaining of left-sided hip pain and tailbone pain.  Described as constant, throbbing, worse with any activity and was relieved partially with rest. Pain was severe upon awakening.  Received a dose of oxycodone IR 10 mg last evening and needed an additional 4 mg of morphine overnight to control the pain.     Review of Systems   Constitutional:  No Fever, No Chills, +Fatigue  HEENT: Denied complaints  Cardiovascular: Denied complaints  Respiratory:   Denied complaints  Gastrointestinal: + Nausea, No Vomiting  Genitourinary:   Denied complaints  Musculoskeletal: Left hip and tailbone pain  Neuro: Generalized weakness, No confusion  Heme/Lymph:   Denied complaints  Endocrine:  Denied complaints  Psych: + Anxiety,No Depression     Objective   Objective     Vitals:   Temp:  [97.8 °F (36.6 °C)-99.7 °F (37.6 °C)] 99.2 °F (37.3 °C)  Heart Rate:  [] 86  Resp:  [20-22] 20  BP: (116-156)/(58-79) 116/58  Physical Exam    Constitutional: Elderly  female, lying in bed, alert and conversant   Eyes: Pupils equal and reactive, no conjunctival injection   HENT: NCAT, moist mucous membranes   Neck: Supple, trachea midline   Respiratory: Clear to auscultation bilaterally, nonlabored respirations    Cardiovascular: RRR, systolic murmur, no pedal edema   Gastrointestinal: Positive bowel sounds, soft, nontender,  nondistended   Musculoskeletal: No gross deformities, no joint effusion, leg lengths appear equal   Neurologic: Oriented x 3, Cranial Nerves grossly intact, speech clear, unable to assess strength on lower extremities patient is lying in bed and has significant pain with movement   Skin: Warm and dry, no rashes     Result Review    Result Review:  I have personally reviewed the results from the time of this admission to 1/3/2022 07:17 EST and agree with these findings:  [x]  Laboratory  BMP    BMP 1/2/22 1/3/22   BUN 29 (A) 33 (A)   Creatinine 1.03 (A) 1.08 (A)   Sodium 134 (A) 136   Potassium 3.9 3.3 (A)   Chloride 95 (A) 100   CO2 26.4 25.9   Calcium 9.5 8.2 (A)   (A) Abnormal value       Comments are available for some flowsheets but are not being displayed.             []  Microbiology  [x]  Radiology   PROCEDURE:  XR HIP W OR WO PELVIS 2-3 VIEW LEFT     COMPARISON: None     INDICATIONS:  FALL     FINDINGS:          There are acute mildly displaced fractures of the left superior and inferior pubic rami.  Fracture   lines do not appear to involve the acetabulum.  Sacroiliac joints appear intact.  There is mild   narrowing of the hip joint spaces bilaterally.  No other acute fractures are seen.  There are   degenerative changes of the lower lumbar spine.     CONCLUSION:   1. Acute fractures of the left superior and inferior pubic rami  2. Minimal degenerative change of the hip joints bilaterally.     ALONZO CLAUDIO MD         Electronically Signed and Approved By: ALONZO CLAUDIO MD on 1/02/2022 at 12:41            [x]  EKG/Telemetry normal sinus rhythm with occasional PVC  []  Cardiology/Vascular   []  Pathology  []  Old records  []  Other:    Assessment/Plan   Assessment / Plan     Assessment:  Active Hospital Problems    Diagnosis  POA   • **Pelvic fracture (HCC) [S32.9XXA]  Yes   • Osteopenia [M85.80]  Yes   • Degenerative arthritis of lumbar spine [M47.816]  Yes   • Hypokalemia [E87.6]  Yes   • Syncope [R55]   Unknown   • Left hip pain [M25.552]  Yes   • CAD [I25.10]  Yes   • Essential hypertension [I10]  Yes       Plan:    Orthopedic surgery consulted  Will keep nonweightbearing on the lower extremity until official recommendations from Ortho  Continue oral oxycodone regimen as ordered  Will adjust IV morphine to 2 mg every 3 hours PRN for severe pain  Continue lidocaine patch  Continue bowel regimen   PT/OT on board    No DEXA scan on file.  Given age, mechanism of fracture and osteopenia noted on lumbar plain film she likely has underlying osteoporosis and may benefit from starting a bisphosphonate as outpatient. Adjusted calcium level is normal. Check vitamin D 25 OH level    Syncopal event does sound vasovagal in etiology as she said that it was preceded by severe pain and diaphoresis.  Serial troponins have been negative and admission EKG did not show acute ischemic changes.  Telemetry overnight has not shown any arrhythmias.   -> TTE ordered    P.o. potassium chloride 40 mEq x 1  Hold HCTZ   Restart home Toprol-XL 25 mg daily       Will be risk for DVT going forward.  Continue Lovenox 40 mg daily    Dispo: Will need rehab    Discussed plan with RN.    DVT prophylaxis:  Medical DVT prophylaxis orders are present.    CODE STATUS:   Level Of Support Discussed With: Patient  Code Status (Patient has no pulse and is not breathing): No CPR (Do Not Attempt to Resuscitate)  Medical Interventions (Patient has pulse or is breathing): Full Support      Electronically signed by Ismael Cabrera DO, 01/03/22, 7:17 AM EST.

## 2022-01-03 NOTE — PAYOR COMM NOTE
"Sarina Solis (89 y.o. Female)             Date of Birth Social Security Number Address Home Phone MRN    06/19/1932  [address] [place] [address] [phone] [id number]    Yarsani Marital Status             Unknown        Admission Date Admission Type Admitting Provider Attending Provider Department, Room/Bed    1/2/22 Emergency Juan Burton MD Vasser-Smith, [place], MD [address] JOINT [place], 229/1    Discharge Date Discharge Disposition Discharge Destination                         Attending Provider: Juan Burton MD    Allergies: Meloxicam, Codeine    Isolation: None   Infection: None   Code Status: No CPR   Advance Care Planning Activity    Ht: 160 cm (63\")   Wt: 59.3 kg (130 lb 11.7 oz)    Admission Cmt: None   Principal Problem: None                Active Insurance as of 1/2/2022     Primary Coverage     Payor Plan Insurance Group Employer/Plan Group    [employer] - ASA [id number]     Payor Plan Address Payor Plan Phone Number Payor Plan Fax Number Effective Dates    [address]   10/1/2021 - None Entered    [place] [address]       Subscriber Name Subscriber Birth Date Member ID       SARINA SOLIS 6/19/1932 [id number]                 Emergency Contacts      (Rel.) Home Phone Work Phone Mobile Phone    STACEY ACOSTA (Sister) -- -- [phone]                Musculoskeletal Disease GRG - Clinical Indications for Admission to Inpatient Care by Nova Martinez, RN         Met: Reviewed on 1/2/2022 by Nova Martinez RN       Created Using Review Status Review Entered   [employer]® Completed 1/2/2022 19:02       Criteria Set Name - Subset   Musculoskeletal Disease GRG - Clinical Indications for Admission to Inpatient Care      Criteria Review      Clinical Indications for Admission to Inpatient Care    Most Recent : Nova Martinez Most Recent Date: 1/2/2022 19:02:44 EST    (X) Hospital admission is needed for " appropriate care of the patient because of  1 or more  of    the following :       (X) Severe pain requiring acute inpatient management due to musculoskeletal condition       2022 19:02:44 EST by Nova Martinez         Pt is 88 y/o that felt a pain in left hip while sitting on toilet & stood up to walk & passed out. c/o severe pain @ pelvis area s/p fall.  XR=Acute fractures of the left superior and inferior pubic rami.  Hx: CAD,HDL, HTN, recent PNA with abx.       Mary: GUZMAN [id number] Tax ID [id number]  Problem List           Codes Noted - Resolved       Hospital    Pelvic fracture (HCC) ICD-10-CM: S32.9XXA  ICD-9-CM: 808.8 2022 - Present       Non-Hospital    CAD ICD-10-CM: I25.10  ICD-9-CM: 414.01 10/20/2021 - Present    Essential hypertension ICD-10-CM: I10  ICD-9-CM: 401.9 10/20/2021 - Present    Hyperlipidemia LDL goal <70 ICD-10-CM: E78.5  ICD-9-CM: 272.4 10/20/2021 - Present    Allergic rhinitis ICD-10-CM: J30.9  ICD-9-CM: 477.9 10/20/2021 - Present    Arthritis ICD-10-CM: M19.90  ICD-9-CM: 716.90 10/20/2021 - Present             History & Physical      Juan Burton MD [facility]           [facility]  [facility]: [facility]   Patient Name: Sarina Trevino  : 1932  MRN: [id number]  Primary Care Physician:  Provider, No Known  Date of admission: 2022    Subjective   Subjective     Chief Complaint: I passed out  HPI:    Sarina Trevino is a 89 y.o. female with past medical history significant for hypertension, coronary disease, dyslipidemia who reports that today after she had taken a bath she was sitting on her toilet clipping her toenails.  She states that she began having pain in her left hip and suddenly became diaphoretic.  She reports that she got up off the toilet to walk to her bedroom by the time she got to the bed from door she passed out and fell to the floor.  She states she is not sure how long she was actually out.  She reports  that she was unable to get up off the floor after the fall.  She reports that she eventually was able to reach a nearby phone and called her sister.  Her sister was unable to get her up out of the floor and subsequently EMS was called and she was brought to the emergency department.  In the ED she was found to have a left superior and inferior pelvic rami fracture.  Patient denies any chest pain, shortness of breath, palpitations, no nausea/vomiting, lightheadedness or dizziness surrounding her syncopal event.  She does report a recent cough which is been nonproductive.  She states that she has been on antibiotics within the past 2 months for pneumonia.  She denies any fever or chills.    Personal History     Past Medical History:  Hypertension  Coronary artery disease  Dyslipidemia  Osteoarthritis    Past Surgical History:  Heart catheterization    Family History:   Hypertension    Social History:   Patient lives alone.  She denies tobacco EtOH or illicit drug use.    Home Medications:  acetaminophen, aspirin, hydroCHLOROthiazide, metoprolol succinate XL, and telmisartan    Allergies:  Allergies   Allergen Reactions   • Meloxicam Dizziness   • Codeine Nausea And Vomiting       Review of Systems   All systems were reviewed and negative except for: As noted per HPI    Objective   Objective     Vitals:   Temp:  [97.8 °F (36.6 °C)] 97.8 °F (36.6 °C)  Heart Rate:  [] 100  Resp:  [21] 21  BP: (156)/(79) 156/79    Physical Exam    Constitutional: Awake, alert, no acute distress   Eyes: Pupils equal and round reactive to light, sclerae anicteric, no conjunctival injection   HENT: NCAT, mucous membranes moist   Neck: Supple, no thyromegaly, no lymphadenopathy, trachea midline   Respiratory: Clear to auscultation bilaterally, nonlabored respirations    Cardiovascular: Tachycardic S1-S2, no appreciable murmurs, rubs, or gallops, palpable pedal pulses bilaterally   Gastrointestinal: Positive bowel sounds, soft,  nontender, nondistended   Musculoskeletal: No bilateral ankle edema, no clubbing or cyanosis to extremities   Psychiatric: Appropriate affect, cooperative   Neurologic: Oriented x 3, moving all extremities although bilateral extremity movement limited secondary to pain, Cranial Nerves grossly intact to confrontation, speech clear   Skin: No rashes     Result Review    Result Review:  I have personally reviewed the results from the time of this admission to 1/2/2022 16:29 EST and agree with these findings:  [x]  Laboratory  []  Microbiology  [x]  Radiology  [x]  EKG/Telemetry: Sinus tachycardia rate of 104 R wave progression noted in the anterior leads.  LVH with repolarization.  []  Cardiology/Vascular   []  Pathology  [x]  Old records  []  Other:      Assessment/Plan   Assessment / Plan     Assessment:   Syncope.  Most likely vasovagal in nature.  Pelvic fracture status post fall  Systemic inflammatory response syndrome.(With tachycardia, leukocytosis)  Hypertension.  Blood pressure is currently low normal with systolic blood pressures noted to be in the 110s at the time of my evaluation    Plan:  Admit to the hospitalist service to telemetry monitored bed.  Obtain 2D echo in the a.m. for further evaluation of syncopal episode.  Trend cardiac enzymes.  Initiate pain management with Tylenol/oxycodone/morphine  PT and OT eval in the a.m.  Case management consult for rehab placement.  Initiate IV fluids with normal saline at 100 cc/h.   Check UA and chest x-ray given patient's leukocytosis and tachycardia.  Home medications will be reviewed and resumed as clinically indicated.  Oral antihypertensives will be resumed with parameters although her hydrochlorothiazide will be held at this time.      DVT prophylaxis:  Medical DVT prophylaxis orders are present.    CODE STATUS:    Level Of Support Discussed With: Patient  Code Status (Patient has no pulse and is not breathing): No CPR (Do Not Attempt to  Resuscitate)  Medical Interventions (Patient has pulse or is breathing): Full Support      Admission Status:  I believe this patient meets inpatient status.    Electronically signed by Juan Burton MD, 01/02/22, 4:29 PM EST.             Electronically signed by Juan Burton MD at 01/02/22 1649          Emergency Department Notes      Orquidea Umana PA-C at 01/02/22 1316      Procedure Orders    1. Laceration Repair [[id number]] ordered by Orquidea Umana PA-C          Attestation signed by Santo Nye MD at 01/02/22 1817          For this patient encounter, I reviewed the NP or PA documentation, treatment plan, and medical decision making. Santo Nye MD 1/2/2022 18:17 EST                  Subjective   History of Present Illness    Review of Systems    Past Medical History:   Diagnosis Date   • Anxiety    • CAD 10/20/2021   • Cancer (HCC)    • Essential hypertension 10/20/2021   • Hyperlipidemia LDL goal <70 10/20/2021   • Pneumonia        Allergies   Allergen Reactions   • Meloxicam Dizziness   • Codeine Nausea And Vomiting       History reviewed. No pertinent surgical history.    History reviewed. No pertinent family history.    Social History     Socioeconomic History   • Marital status:    Tobacco Use   • Smoking status: Never Smoker   • Smokeless tobacco: Never Used   Vaping Use   • Vaping Use: Never used   Substance and Sexual Activity   • Alcohol use: Never   • Drug use: Never   • Sexual activity: Not Currently           Objective   Physical Exam    Laceration Repair    Date/Time: 1/2/2022 1:16 PM  Performed by: Orquidea Umana PA-C  Authorized by: Santo Nye MD     Consent:     Consent obtained:  Verbal    Consent given by:  Patient    Risks discussed:  Pain  Anesthesia (see MAR for exact dosages):     Anesthesia method:  None  Laceration details:     Location:  Face    Face location:  Forehead    Length (cm):  5  Repair type:     Repair  type:  Simple  Pre-procedure details:     Preparation:  Patient was prepped and draped in usual sterile fashion  Exploration:     Hemostasis achieved with:  Direct pressure    Wound exploration: wound explored through full range of motion and entire depth of wound probed and visualized      Wound extent: no areolar tissue violation noted, no fascia violation noted, no foreign bodies/material noted, no muscle damage noted, no nerve damage noted, no tendon damage noted, no underlying fracture noted and no vascular damage noted      Contaminated: no    Treatment:     Area cleansed with:  Betadine and saline    Amount of cleaning:  Standard    Visualized foreign bodies/material removed: no    Skin repair:     Repair method:  Tissue adhesive  Approximation:     Approximation:  Close  Post-procedure details:     Dressing:  Open (no dressing)    Patient tolerance of procedure:  Tolerated well, no immediate complications              ED Course                                                 MDM    Final diagnoses:   None       ED Disposition  ED Disposition     None          No follow-up provider specified.       Medication List      No changes were made to your prescriptions during this visit.          Orquidea Umana PA-C  01/02/22 1318      Electronically signed by Santo Nye MD at 01/02/22 1817       Vital Signs (last day)     Date/Time Temp Temp src Pulse Resp BP Patient Position SpO2    01/02/22 1822 98.9 (37.2) Axillary 101 22 149/74 Lying 98    01/02/22 1415 -- -- 100 -- -- -- 94    01/02/22 1146 97.8 (36.6) Oral 85 21 156/79 Lying 98          Oxygen Therapy (last day)     Date/Time SpO2 Device (Oxygen Therapy) Flow (L/min) Oxygen Concentration (%) ETCO2 (mmHg)    01/02/22 1822 98 room air -- -- --    01/02/22 1415 94 -- -- -- --    01/02/22 1146 98 room air -- -- --          Intake & Output (last day)     None          Facility-Administered Medications as of 1/2/2022   Medication Dose Route Frequency  Provider Last Rate Last Admin   • acetaminophen (TYLENOL) tablet 1,000 mg  1,000 mg Oral TID Juan Burton MD       • benzonatate (TESSALON) capsule 100 mg  100 mg Oral TID PRN Juan Burton MD       • sennosides-docusate (PERICOLACE) 8.6-50 MG per tablet 2 tablet  2 tablet Oral BID Juan Burton MD        And   • polyethylene glycol (MIRALAX) packet 17 g  17 g Oral Daily PRN Juan Burton MD        And   • bisacodyl (DULCOLAX) EC tablet 5 mg  5 mg Oral Daily PRN Juan Burton MD        And   • bisacodyl (DULCOLAX) suppository 10 mg  10 mg Rectal Daily PRN Juan Burton MD       • [START ON 1/3/2022] enoxaparin (LOVENOX) syringe 40 mg  40 mg Subcutaneous Daily Juan Burton MD       • famotidine (PEPCID) tablet 40 mg  40 mg Oral Daily Juan Burton MD   40 mg at 01/02/22 1723   • lidocaine (LIDODERM) 5 % 1 patch  1 patch Transdermal Q24H Juan Burton MD       • melatonin tablet 5 mg  5 mg Oral Nightly PRN Juan Burton MD       • morphine injection 2 mg  2 mg Intravenous Q4H PRN Juan Burton MD   2 mg at 01/02/22 1723   • [COMPLETED] morphine injection 4 mg  4 mg Intravenous Once Santo Nye MD   4 mg at 01/02/22 1241   • nitroglycerin (NITROSTAT) SL tablet 0.4 mg  0.4 mg Sublingual Q5 Min PRN Juan Burton MD       • oxyCODONE (ROXICODONE) immediate release tablet 10 mg  10 mg Oral Q4H PRN Juan Burton MD       • oxyCODONE (ROXICODONE) immediate release tablet 5 mg  5 mg Oral Q4H PRN Juan Burton MD       • sodium chloride 0.9 % flush 10 mL  10 mL Intravenous Q12H Juan Burton MD       • sodium chloride 0.9 % flush 10 mL  10 mL Intravenous PRN Juan Burton MD   10 mL at 01/02/22 1723   • sodium chloride 0.9 % infusion  100 mL/hr Intravenous Continuous uJan Burton MD           Lab Results (last 24 hours)     Procedure Component Value  Units Date/Time    Urinalysis With Culture If Indicated - Urine, Catheter In/Out [[id number]]  (Abnormal) Collected: 01/02/22 1724    Specimen: Urine, Catheter In/Out Updated: 01/02/22 1812     Color, UA Yellow     Appearance, UA Clear     pH, UA 7.5     Specific Gravity, UA 1.020     Glucose, UA Negative     Ketones, UA 15 mg/dL (1+)     Bilirubin, UA Negative     Blood, UA Negative     Protein, UA 30 mg/dL (1+)     Leuk Esterase, UA Negative     Nitrite, UA Negative     Urobilinogen, UA 0.2 E.U./dL    Urinalysis, Microscopic Only - Urine, Catheter In/Out [[id number]] Collected: 01/02/22 1724    Specimen: Urine, Catheter In/Out Updated: 01/02/22 1800    TSH Rfx On Abnormal To Free T4 [[id number]]  (Normal) Collected: 01/02/22 1240    Specimen: Blood from Arm, Left Updated: 01/02/22 1723     TSH 2.590 uIU/mL     Troponin [[id number]]  (Normal) Collected: 01/02/22 1240    Specimen: Blood from Arm, Left Updated: 01/02/22 1715     Troponin T <0.010 ng/mL     Narrative:      Troponin T Reference Range:  <= 0.03 ng/mL-   Negative for AMI  >0.03 ng/mL-     Abnormal for myocardial necrosis.  Clinicians would have to utilize clinical acumen, EKG, Troponin and serial changes to determine if it is an Acute Myocardial Infarction or myocardial injury due to an underlying chronic condition.       Results may be falsely decreased if patient taking Biotin.      Comprehensive Metabolic Panel [[id number]]  (Abnormal) Collected: 01/02/22 1240    Specimen: Blood from Arm, Left Updated: 01/02/22 1338     Glucose 109 mg/dL      BUN 29 mg/dL      Creatinine 1.03 mg/dL      Sodium 134 mmol/L      Potassium 3.9 mmol/L      Comment: Specimen hemolyzed.  Results may be affected.        Chloride 95 mmol/L      CO2 26.4 mmol/L      Calcium 9.5 mg/dL      Total Protein 7.8 g/dL      Albumin 4.30 g/dL      ALT (SGPT) 13 U/L      Comment: Specimen hemolyzed.  Results may be affected.        AST (SGOT) 28 U/L      Comment: Specimen hemolyzed.  Results  may be affected.        Alkaline Phosphatase 82 U/L      Total Bilirubin 0.6 mg/dL      eGFR Non African Amer 50 mL/min/1.73      Globulin 3.5 gm/dL      A/G Ratio 1.2 g/dL      BUN/Creatinine Ratio 28.2     Anion Gap 12.6 mmol/L     Narrative:      GFR Normal >60  Chronic Kidney Disease <60  Kidney Failure <15      T4, Free [[id number]]  (Normal) Collected: 01/02/22 1240    Specimen: Blood from Arm, Left Updated: 01/02/22 1333     Free T4 1.59 ng/dL     Narrative:      Results may be falsely increased if patient taking Biotin.      TSH [[id number]]  (Normal) Collected: 01/02/22 1240    Specimen: Blood from Arm, Left Updated: 01/02/22 1333     TSH 2.530 uIU/mL     CK [[id number]]  (Normal) Collected: 01/02/22 1240    Specimen: Blood from Arm, Left Updated: 01/02/22 1327     Creatine Kinase 171 U/L     Magnesium [[id number]]  (Normal) Collected: 01/02/22 1240    Specimen: Blood from Arm, Left Updated: 01/02/22 1327     Magnesium 2.3 mg/dL     CBC & Differential [[id number]]  (Abnormal) Collected: 01/02/22 1240    Specimen: Blood from Arm, Left Updated: 01/02/22 1252    Narrative:      The following orders were created for panel order CBC & Differential.  Procedure                               Abnormality         Status                     ---------                               -----------         ------                     CBC Auto Differential[[id number]]        Abnormal            Final result                 Please view results for these tests on the individual orders.    CBC Auto Differential [[id number]]  (Abnormal) Collected: 01/02/22 1240    Specimen: Blood from Arm, Left Updated: 01/02/22 1252     WBC 18.05 10*3/mm3      RBC 3.55 10*6/mm3      Hemoglobin 11.0 g/dL      Hematocrit 33.1 %      MCV 93.2 fL      MCH 31.0 pg      MCHC 33.2 g/dL      RDW 12.6 %      RDW-SD 43.2 fl      MPV 9.6 fL      Platelets 314 10*3/mm3      Neutrophil % 86.7 %      Lymphocyte % 7.0 %      Monocyte % 4.9 %      Eosinophil % 0.2 %       Basophil % 0.3 %      Immature Grans % 0.9 %      Neutrophils, Absolute 15.62 10*3/mm3      Lymphocytes, Absolute 1.27 10*3/mm3      Monocytes, Absolute 0.89 10*3/mm3      Eosinophils, Absolute 0.04 10*3/mm3      Basophils, Absolute 0.06 10*3/mm3      Immature Grans, Absolute 0.17 10*3/mm3      nRBC 0.0 /100 WBC         Imaging Results (Last 24 Hours)     Procedure Component Value Units Date/Time    XR Chest 1 View [[id number]] Collected: 01/02/22 1738     Updated: 01/02/22 1741    Narrative:      PROCEDURE: XR CHEST 1 VW     COMPARISON: [facility], CR, XR HIP W OR WO PELVIS 2-3 VIEW LEFT, 1/02/2022, 12:11.    [facility], CR, CHEST PA/AP & LAT 2V, 4/06/2018, 12:46.  [facility], CR, CHEST PA/AP & LAT 2V,   6/25/2019, 11:03.  [facility], CT, CT CERVICAL SPINE WO CONTRAST, 1/02/2022, 13:03.    [facility], CR, CHEST PA/AP & LAT 2V, 9/08/2020, 11:42.     INDICATIONS: cough, leukocytosis     FINDINGS:   Heart size and pulmonary vessels are within normal limits.  There is a 2.3 cm nodule within the   lateral aspect of the mid right lung.  No other definite pulmonary nodule identified.  There are   coarsened interstitial markings throughout both lungs.  No focal airspace consolidation.  No   pleural effusion or pneumothorax.  There is a questionable lytic lesion within the proximal right   humerus, incompletely imaged on this exam.  There is minimal dextro convex scoliosis of the upper   thoracic spine.     CONCLUSION:   1. New 2.3 cm nodule within the lateral aspect of the right mid lung.  CT scan of the chest with IV   contrast would be useful for further evaluation when clinically feasible.  2. Questionable lytic lesion within the proximal right humerus.  This may be included within the   field of view on future chest CT.  Dedicated radiographs of the right shoulder may also be useful   for further evaluation.                  ALONZO CLAUDIO MD         Electronically Signed and Approved By:  ALONZO CLAUDIO MD on 1/02/2022 at 17:38                     CT Cervical Spine Without Contrast [[id number]] Collected: 01/02/22 1322     Updated: 01/02/22 1325    Narrative:      PROCEDURE: CT CERVICAL SPINE WO CONTRAST      6 mm noncalcified COMPARISON: [facility], CT, CT CHEST W/ CONTRAST,   2/23/2009, 9:10.     INDICATIONS: neck pain     PROTOCOL:   Standard imaging protocol performed      RADIATION:       MA and/or KV was adjusted to minimize radiation dose.          TECHNIQUE: After obtaining the patient's consent, multi-planar CT images were created without   contrast material.       FINDINGS:   There is normal height and alignment of the cervical vertebral bodies.  No acute fracture is seen.    Craniovertebral junction appears within normal limits.  There is degenerative disc space narrowing   throughout the entire cervical spine.  No significant spinal canal stenosis identified.  Facet   joints are intact.  There are degenerative changes throughout the cervical spine with only mild   bilateral neural foraminal narrowing at C4/5-C5/6 levels.  The unenhanced soft tissues are within   normal limits.  There are subcentimeter noncalcified nodules in both lung apices, unchanged from   2009.      CONCLUSION:   1. No acute cervical spine fracture or malalignment.            ALONZO CLAUDIO MD         Electronically Signed and Approved By: ALONZO CLAUDIO MD on 1/02/2022 at 13:22                     CT Head Without Contrast [[id number]] Collected: 01/02/22 1316     Updated: 01/02/22 1319    Narrative:      PROCEDURE: CT HEAD WO CONTRAST     COMPARISON:  [facility], CT, HEAD W/O CONTRAST, 9/08/2020, 16:23.  INDICATIONS: trauma     PROTOCOL:   Standard imaging protocol performed      RADIATION:   DLP: 1346.5mGy*cm    MA and/or KV was adjusted to minimize radiation dose.          TECHNIQUE: After obtaining the patient's consent, CT images were obtained without non-ionic   intravenous  contrast material.      FINDINGS:   There is mild generalized parenchymal volume loss.  There is no evidence of acute infarct or   hemorrhage.  There is patchy low attenuation within the periventricular white matter bilaterally   compatible changes of chronic small vessel ischemic disease.  There is partial opacification of   bilateral frontal sinuses.  Other paranasal sinuses and mastoid air cells are clear.  No acute   skull fracture identified.  Globes and orbits are within normal limits.     CONCLUSION:   1. Mild generalized parenchymal volume loss and changes of chronic small vessel ischemic disease.  2. No acute intracranial abnormality.  No acute skull fracture.            ALONZO CLAUDIO MD         Electronically Signed and Approved By: ALONZO CLAUDIO MD on 1/02/2022 at 13:16                     XR Spine Lumbar 2 or 3 View [[id number]] Collected: 01/02/22 1313     Updated: 01/02/22 1316    Narrative:      PROCEDURE: XR SPINE LUMBAR 2 OR 3 VW     COMPARISON: None     INDICATIONS: FELL COMPLAINS OF LOWER BACK PAIN     FINDINGS:   Is mild osteopenia.  There is degenerative disc space narrowing throughout the entire lumbar spine.    On the AP view there is dextro convex scoliosis of the lumbar spine centered near the L4 level.    No vertebral body anomaly identified.  Thoracolumbar junction appears within normal limits.  There   is atherosclerotic vascular calcifications throughout the aorta.  Sacroiliac joints appear intact.     CONCLUSION:   1. Osteopenia and degenerative disc disease throughout the lumbar spine.  No acute bony   abnormality.            ALONZO CLAUDIO MD         Electronically Signed and Approved By: ALONZO CLAUDIO MD on 1/02/2022 at 13:13                     XR Hip With or Without Pelvis 2 - 3 View Left [[id number]] Collected: 01/02/22 1241     Updated: 01/02/22 1244    Narrative:      PROCEDURE: XR HIP W OR WO PELVIS 2-3 VIEW LEFT     COMPARISON: None     INDICATIONS: FALL     FINDINGS:   There  "are acute mildly displaced fractures of the left superior and inferior pubic rami.  Fracture   lines do not appear to involve the acetabulum.  Sacroiliac joints appear intact.  There is mild   narrowing of the hip joint spaces bilaterally.  No other acute fractures are seen.  There are   degenerative changes of the lower lumbar spine.     CONCLUSION:   1. Acute fractures of the left superior and inferior pubic rami  2. Minimal degenerative change of the hip joints bilaterally.               ALONZO CLAUDIO MD         Electronically Signed and Approved By: AOLNZO CLAUDIO MD on 1/02/2022 at 12:41                         Orders (last 24 hrs)      Start     Ordered    01/03/22 0900  enoxaparin (LOVENOX) syringe 40 mg  Daily         01/02/22 1628    01/03/22 0600  Basic Metabolic Panel  Morning Draw         01/02/22 1628    01/03/22 0600  CBC (No Diff)  Morning Draw         01/02/22 1628    01/03/22 0600  Magnesium  Morning Draw         01/02/22 1628    01/03/22 0600  Phosphorus  Morning Draw         01/02/22 1628    01/02/22 2230  Troponin  Every 6 Hours      Comments: Perform Draw 6 Hours After First Troponin in ED      01/02/22 1628    01/02/22 2100  sodium chloride 0.9 % flush 10 mL  Every 12 Hours Scheduled         01/02/22 1628    01/02/22 2100  sennosides-docusate (PERICOLACE) 8.6-50 MG per tablet 2 tablet  2 Times Daily        \"And\" Linked Group Details    01/02/22 1628    01/02/22 2000  Vital Signs  Every 4 Hours       01/02/22 1628    01/02/22 1801  Urinalysis, Microscopic Only - Urine, Catheter In/Out  Once         01/02/22 1800    01/02/22 1800  Incentive Spirometry  Every 4 Hours While Awake       01/02/22 1628    01/02/22 1741  Insert Indwelling Urinary Catheter  Once        \"And\" Linked Group Details    01/02/22 1741    01/02/22 1741  Assess Need for Indwelling Urinary Catheter - Follow Removal Protocol  Continuous        Comments: Indwelling Urinary Catheter Removal Criteria  Discontinue Indwelling Urinary " "Catheter Unless One of the Following is Present:  Urinary Retention or Obstruction  Chronic Urinary Catheter Use  End of Life  Critical Illness with Strict I/O   Tract or Abdominal Surgery  Stage 3/4 Sacral / Perineal Wound  Required Activity Restriction: Trauma  Required Activity Restriction: Spine Surgery  If Patient is Being Followed by Urology Contact Them PRIOR to Removal  Do Not Remove Indwelling Urinary Catheter Order is Present with a CLINICAL REASON to Maintain the Catheter. Provider is Required to Include a Clinical Reason to Maintain a Urinary Catheter    Patient Admitted With Indwelling Urinary Catheter (Not Placed at [facility])  Assess for Continued Need & Document Medical Necessity  If Infection is Suspected, Contact the Provider       \"And\" Linked Group Details    01/02/22 1741    01/02/22 1741  Urinary Catheter Care  Every Shift      \"And\" Linked Group Details    01/02/22 1741    01/02/22 1650  benzonatate (TESSALON) capsule 100 mg  3 Times Daily PRN         01/02/22 1650    01/02/22 1650  TSH Rfx On Abnormal To Free T4  Once         01/02/22 1650    01/02/22 1630  acetaminophen (TYLENOL) tablet 1,000 mg  3 Times Daily         01/02/22 1628    01/02/22 1630  lidocaine (LIDODERM) 5 % 1 patch  Every 24 Hours Scheduled         01/02/22 1628    01/02/22 1630  famotidine (PEPCID) tablet 40 mg  Daily         01/02/22 1628    01/02/22 1629  Daily Weights  Daily       01/02/22 1628    01/02/22 1629  Code Status and Medical Interventions:  Continuous         01/02/22 1628    01/02/22 1627  oxyCODONE (ROXICODONE) immediate release tablet 10 mg  Every 4 Hours PRN         01/02/22 1628    01/02/22 1626  polyethylene glycol (MIRALAX) packet 17 g  Daily PRN        \"And\" Linked Group Details    01/02/22 1628    01/02/22 1626  bisacodyl (DULCOLAX) EC tablet 5 mg  Daily PRN        \"And\" Linked Group Details    01/02/22 1628    01/02/22 1626  bisacodyl (DULCOLAX) suppository 10 mg  Daily PRN        \"And\" " Linked Group Details    01/02/22 1628    01/02/22 1625  XR Chest 1 View  1 Time Imaging         01/02/22 1628    01/02/22 1625  Urinalysis With Culture If Indicated - Urine, Catheter In/Out  STAT         01/02/22 1628    01/02/22 1624  Troponin  STAT         01/02/22 1628    01/02/22 1620  Diet Regular; Cardiac  Diet Effective Now         01/02/22 1628    01/02/22 1615  sodium chloride 0.9 % infusion  Continuous         01/02/22 1628    01/02/22 1614  Inpatient Admission  Once         01/02/22 1628    01/02/22 1614  Cardiac Monitoring  Continuous,   Status:  Canceled         01/02/22 1628    01/02/22 1614  Telemetry - Maintain IV Access  Continuous,   Status:  Canceled         01/02/22 1628    01/02/22 1614  Continuous Cardiac Monitoring  Continuous         01/02/22 1628    01/02/22 1614  ACLS Protocol For Life Threatening Dysrhythmias (Unless Code Status Indicates Otherwise)  Continuous         01/02/22 1628    01/02/22 1614  Notify Provider if ACLS Protocol Activated  Until Discontinued         01/02/22 1628    01/02/22 1614  Notify Provider (With Default Parameters)  Until Discontinued         01/02/22 1628    01/02/22 1614  Intake & Output  Every Shift       01/02/22 1628    01/02/22 1614  Weigh Patient  Once         01/02/22 1628    01/02/22 1614  Fall Precautions  Continuous         01/02/22 1628    01/02/22 1614  Oxygen Therapy- Nasal Cannula; Titrate for SPO2: 90% - 95%  Continuous         01/02/22 1628    01/02/22 1614  Inpatient Case Management  Consult  Once        Provider:  (Not yet assigned)    01/02/22 1628    01/02/22 1614  OT Consult: Eval & Treat  Once        Comments: Status post pelvic fracture    01/02/22 1628    01/02/22 1614  PT Consult: Eval & Treat Discharge Placement Assessment, Functional Mobility Below Baseline  Once        Comments: Pelvic fracture    01/02/22 1628    01/02/22 1614  Insert Peripheral IV  Once         01/02/22 1628    01/02/22 1614  Saline Lock & Maintain  IV Access  Continuous         01/02/22 1628    01/02/22 1613  melatonin tablet 5 mg  Nightly PRN         01/02/22 1628    01/02/22 1613  sodium chloride 0.9 % flush 10 mL  As Needed         01/02/22 1628    01/02/22 1613  oxyCODONE (ROXICODONE) immediate release tablet 5 mg  Every 4 Hours PRN         01/02/22 1628    01/02/22 1613  morphine injection 2 mg  Every 4 Hours PRN         01/02/22 1628    01/02/22 1613  Oxygen Therapy- Nasal Cannula; Titrate for SPO2: 90% - 95%  Continuous PRN,   Status:  Canceled      Comments: If Patient Develops Unresponsiveness, Acute Dyspnea, Cyanosis or Suspected Hypoxemia Start Continuous Pulse Ox Monitoring, Apply Oxygen & Notify Provider    01/02/22 1628    01/02/22 1613  nitroglycerin (NITROSTAT) SL tablet 0.4 mg  Every 5 Minutes PRN         01/02/22 1628    01/02/22 1453  Inpatient Hospitalist Consult  Once        Specialty:  Hospitalist  Provider:  Juan Burton MD    01/02/22 1452    01/02/22 1317  Laceration Repair  Once        Comments: This order was created via procedure documentation    01/02/22 1316    01/02/22 1232  XR Hip With or Without Pelvis 2 - 3 View Left  1 Time Imaging         01/02/22 1154    01/02/22 1230  morphine injection 4 mg  Once         01/02/22 1226    01/02/22 1228  ECG 12 Lead  STAT         01/02/22 1227    01/02/22 1227  CT Cervical Spine Without Contrast  1 Time Imaging         01/02/22 1226    01/02/22 1227  CBC & Differential  STAT         01/02/22 1227    01/02/22 1227  CK  STAT         01/02/22 1227    01/02/22 1227  Comprehensive Metabolic Panel  STAT         01/02/22 1227    01/02/22 1227  Magnesium  STAT         01/02/22 1227    01/02/22 1227  T4, Free  STAT         01/02/22 1227    01/02/22 1227  TSH  STAT         01/02/22 1227    01/02/22 1227  Urinalysis With Culture If Indicated - Urine, Clean Catch  STAT,   Status:  Canceled         01/02/22 1227    01/02/22 1227  CBC Auto Differential  PROCEDURE ONCE         01/02/22 1223     01/02/22 1226  CT Head Without Contrast  1 Time Imaging         01/02/22 1226    01/02/22 1226  XR Spine Lumbar 2 or 3 View  1 Time Imaging         01/02/22 1226    01/02/22 1155  XR Hip With or Without Pelvis 2 - 3 View Right  1 Time Imaging,   Status:  Canceled         01/02/22 1154    Unscheduled  Telemetry - Pulse Oximetry  Continuous PRN      Comments: If Patient Develops Unresponsiveness, Acute Dyspnea, Cyanosis or Suspected Hypoxemia Start Continuous Pulse Ox Monitoring, Apply Oxygen & Notify Provider    01/02/22 1628    Unscheduled  ECG 12 Lead  As Needed      Comments: Nurse to Release if Patient Expericences Acute Chest Pain or Dysrhythmias    01/02/22 1628    Unscheduled  Follow Acute Urinary Retention Protocol  As Needed       01/02/22 1628    Unscheduled  Up With Assistance  As Needed       01/02/22 1628                Physician Progress Notes (last 24 hours)  Notes from 01/01/22 1905 through 01/02/22 1905   No notes of this type exist for this encounter.         Consult Notes (last 24 hours)  Notes from 01/01/22 1905 through 01/02/22 1905   No notes of this type exist for this encounter.         Physical Therapy Notes (last 24 hours)  Notes from 01/01/22 1905 through 01/02/22 1905   No notes exist for this encounter.         Occupational Therapy Notes (last 24 hours)  Notes from 01/01/22 1905 through 01/02/22 1905   No notes exist for this encounter.

## 2022-01-03 NOTE — PLAN OF CARE
Goal Outcome Evaluation:  Plan of Care Reviewed With: patient        Progress: no change  Outcome Summary: pt resting quietly, medicated for c/o of pain with relief noted. no changes in pt's status.

## 2022-01-03 NOTE — THERAPY EVALUATION
Acute Care - Physical Therapy Initial Evaluation   Mary     Patient Name: Sarina Trevino  : 1932  MRN: [id number]  Today's Date: 1/3/2022     Admit date: 2022     Referring Physician: Ismael Cabrera DO     Surgery Date:* No surgery found *                Visit Dx:     ICD-10-CM ICD-9-CM   1. Closed nondisplaced fracture of pelvis, unspecified part of pelvis, sequela  S32.9XXS 905.1   2. Difficulty in walking  R26.2 719.7     Patient Active Problem List   Diagnosis   • CAD   • Essential hypertension   • Hyperlipidemia LDL goal <70   • Allergic rhinitis   • Arthritis   • Pelvic fracture (HCC)   • Osteopenia   • Degenerative arthritis of lumbar spine   • Hypokalemia   • Syncope   • Left hip pain     Past Medical History:   Diagnosis Date   • Anxiety    • CAD 10/20/2021   • Cancer (HCC)    • Essential hypertension 10/20/2021   • Hyperlipidemia LDL goal <70 10/20/2021   • Pneumonia      History reviewed. No pertinent surgical history.  PT Assessment (last 12 hours)     PT Evaluation and Treatment     Row Name 22 1100          Physical Therapy Time and Intention    Subjective Information no complaints  -ANDRE     Document Type evaluation  -ANDRE     Mode of Treatment individual therapy; physical therapy  -ANDRE     Patient Effort good  -ANDRE     Row Name 22 1100          General Information    Patient Observations alert; cooperative; agree to therapy  -ANDRE     Prior Level of Function independent:; community mobility; all household mobility  -ANDRE     Existing Precautions/Restrictions fall; weight bearing  -ANDRE     Barriers to Rehab none identified  -ANDRE     Row Name 22 1100          Living Environment    Current Living Arrangements home/apartment/condo  -ANDRE     Lives With alone  -ANDRE     Row Name 22 1100          Home Use of Assistive/Adaptive Equipment    Equipment Currently Used at Home cane, straight; walker, rolling  -ANDRE     Row Name 22 1100          Range of Motion (ROM)    Range of  Motion ROM is WFL  -ANDRE     Row Name 01/03/22 1100          Strength (Manual Muscle Testing)    Strength (Manual Muscle Testing) --  unable to tolerate due to complaints of severe pain  -ANDRE     Row Name 01/03/22 1100          Mobility    Extremity Weight-bearing Status left lower extremity  -ANDRE     Left Lower Extremity (Weight-bearing Status) weight-bearing as tolerated (WBAT)  -ANDRE     Row Name 01/03/22 1100          Bed Mobility    Bed Mobility bed mobility (all) activities; supine-sit  -ANDRE     All Activities, [place] (Bed Mobility) moderate assist (50% patient effort)  -ANDRE     Supine-Sit [place] (Bed Mobility) maximum assist (25% patient effort)  -ANDRE     Row Name 01/03/22 1100          Transfers    Transfers bed-chair transfer; sit-stand transfer  -ANDRE     Bed-Chair [place] (Transfers) maximum assist (25% patient effort)  -ANDRE     Sit-Stand [place] (Transfers) maximum assist (25% patient effort)  -ANDRE     Row Name 01/03/22 1100          Gait/Stairs (Locomotion)    Gait/Stairs Locomotion gait/ambulation assistive device  -ANDRE     [place] Level (Gait) maximum assist (25% patient effort)  -ANDRE     Assistive Device (Gait) walker, front-wheeled  -ANDRE     Distance in Feet (Gait) 3  -ANDRE     Row Name 01/03/22 1100          Safety Issues, Functional Mobility    Impairments Affecting Function (Mobility) balance; pain; strength  -ANDRE     Row Name 01/03/22 1100          Balance    Balance Assessment standing dynamic balance  -ANDRE     Dynamic Standing Balance severe impairment  -ANDRE     Row Name 01/03/22 1100          Plan of Care Review    Plan of Care Reviewed With patient  -ANDRE     Outcome Summary Patient presents with decreased strength, transfers and ambulation.  Skilled physical therapy services will be required to address these mobility deficits.  -ANDRE     Row Name 01/03/22 1100          Physical Therapy Goals    Transfer Goal Selection (PT) transfer, PT goal 1  -ANDRE     Gait Training Goal Selection (PT)  gait training, PT goal 1  -ANDRE     Row Name 01/03/22 1100          Transfer Goal 1 (PT)    Activity/Assistive Device (Transfer Goal 1, PT) transfers, all  -ANDRE     [place] Level/Cues Needed (Transfer Goal 1, PT) contact guard assist  -ANDRE     Time Frame (Transfer Goal 1, PT) long term goal (LTG); 10 days  -ANDRE     Row Name 01/03/22 1100          Gait Training Goal 1 (PT)    Activity/Assistive Device (Gait Training Goal 1, PT) gait (walking locomotion); assistive device use; walker, rolling  -ANDRE     [place] Level (Gait Training Goal 1, PT) contact guard assist  -ANDRE     Distance (Gait Training Goal 1, PT) 50  -ANDRE     Time Frame (Gait Training Goal 1, PT) long term goal (LTG); 10 days  -ANDRE     Row Name 01/03/22 1100          Therapy Assessment/Plan (PT)    Patient/Family Therapy Goals Statement (PT) Patient would like to return home independently  -ANDRE     Rehab Potential (PT) good, to achieve stated therapy goals  -ANDRE     Criteria for Skilled Interventions Met (PT) skilled treatment is necessary  -ANDRE     Predicted Duration of Therapy Intervention (PT) 10 days  -ANDRE     Problem List (PT) problems related to; balance; mobility; strength; pain  -ANDRE     Activity Limitations Related to Problem List (PT) unable to ambulate safely; unable to transfer safely  -ANDRE     Row Name 01/03/22 1100          PT Evaluation Complexity    History, PT Evaluation Complexity no personal factors and/or comorbidities  -ANDRE     Examination of Body Systems (PT Eval Complexity) total of 4 or more elements  -ANDRE     Clinical Presentation (PT Evaluation Complexity) stable  -ANDRE     Clinical Decision Making (PT Evaluation Complexity) low complexity  -ANDRE     Overall Complexity (PT Evaluation Complexity) low complexity  -ANDRE     Row Name 01/03/22 1100          Therapy Plan Review/Discharge Plan (PT)    Therapy Plan Review (PT) evaluation/treatment results reviewed; participants voiced agreement with care plan; participants included; patient  -ANDRE            User Key  (r) = Recorded By, (t) = Taken By, (c) = Cosigned By    Initials Name Provider Type    Sonny Colmenares PT Physical Therapist                Physical Therapy Education                 Title: PT OT SLP Therapies (Done)     Topic: Physical Therapy (Done)     Point: Mobility training (Done)     Learning Progress Summary           Patient Acceptance, E,TB, VU by ANDRE at 1/3/2022 1107                   Point: Precautions (Done)     Learning Progress Summary           Patient Acceptance, E,TB, VU by ANDRE at 1/3/2022 1107                               User Key     Initials Effective Dates Name Provider Type Discipline    ANDRE 06/03/21 -  Sonny Magaña PT Physical Therapist PT              PT Recommendation and Plan  Anticipated Discharge Disposition (PT): skilled nursing facility, inpatient rehabilitation facility  Planned Therapy Interventions (PT): balance training, bed mobility training, gait training, home exercise program, strengthening, transfer training  Therapy Frequency (PT): daily  Plan of Care Reviewed With: patient  Outcome Summary: Patient presents with decreased strength, transfers and ambulation.  Skilled physical therapy services will be required to address these mobility deficits.   Outcome Measures     Row Name 01/03/22 1100             How much help from another person do you currently need...    Turning from your back to your side while in flat bed without using bedrails? 2  -ANDRE      Moving from lying on back to sitting on the side of a flat bed without bedrails? 2  -ANDRE      Moving to and from a bed to a chair (including a wheelchair)? 2  -ANDRE      Standing up from a chair using your arms (e.g., wheelchair, bedside chair)? 2  -ANDRE      Climbing 3-5 steps with a railing? 1  -ANDRE      To walk in hospital room? 2  -ANDRE      AM-PAC 6 Clicks Score (PT) 11  -ANDRE              Functional Assessment    Outcome Measure Options AM-PAC 6 Clicks Basic Mobility (PT)  -ANDRE            User Key  (r) =  Recorded By, (t) = Taken By, (c) = Cosigned By    Initials Name Provider Type    Sonny Colmenares, PT Physical Therapist                 Time Calculation:    PT Charges     Row Name 01/03/22 1101             Time Calculation    PT Received On 01/03/22  -ANDRE      PT Goal Re-Cert Due Date 01/12/22  -ANDRE              Timed Charges    95050 - PT Therapeutic Activity Minutes 10  -ANDRE              Untimed Charges    PT Eval/Re-eval Minutes 20  -ANDRE              Total Minutes    Timed Charges Total Minutes 10  -ANDRE      Untimed Charges Total Minutes 20  -ANDRE       Total Minutes 30  -ANDRE            User Key  (r) = Recorded By, (t) = Taken By, (c) = Cosigned By    Initials Name Provider Type    Sonny Colmenares PT Physical Therapist              Therapy Charges for Today     Code Description Service Date Service Provider Modifiers Qty    75686888084 HC PT THERAPEUTIC ACT EA 15 MIN 1/3/2022 Sonny Magaña, PT GP 1    40426003178 HC PT EVAL LOW COMPLEXITY 2 1/3/2022 Sonny Magaña, PT GP 1          PT G-Codes  Outcome Measure Options: AM-PAC 6 Clicks Basic Mobility (PT)  AM-PAC 6 Clicks Score (PT): 11    Sonny Magaña PT  1/3/2022

## 2022-01-04 ENCOUNTER — APPOINTMENT (OUTPATIENT)
Dept: CT IMAGING | Facility: HOSPITAL | Age: 87
End: 2022-01-04

## 2022-01-04 LAB
25(OH)D3 SERPL-MCNC: 42.7 NG/ML (ref 30–100)
ANION GAP SERPL CALCULATED.3IONS-SCNC: 9.5 MMOL/L (ref 5–15)
BUN SERPL-MCNC: 28 MG/DL (ref 8–23)
BUN/CREAT SERPL: 26.2 (ref 7–25)
CALCIUM SPEC-SCNC: 8.1 MG/DL (ref 8.6–10.5)
CHLORIDE SERPL-SCNC: 103 MMOL/L (ref 98–107)
CO2 SERPL-SCNC: 23.5 MMOL/L (ref 22–29)
CREAT SERPL-MCNC: 1.07 MG/DL (ref 0.57–1)
DEPRECATED RDW RBC AUTO: 45.9 FL (ref 37–54)
ERYTHROCYTE [DISTWIDTH] IN BLOOD BY AUTOMATED COUNT: 13 % (ref 12.3–15.4)
GFR SERPL CREATININE-BSD FRML MDRD: 48 ML/MIN/1.73
GLUCOSE SERPL-MCNC: 118 MG/DL (ref 65–99)
HCT VFR BLD AUTO: 28 % (ref 34–46.6)
HGB BLD-MCNC: 9.1 G/DL (ref 12–15.9)
MCH RBC QN AUTO: 31.3 PG (ref 26.6–33)
MCHC RBC AUTO-ENTMCNC: 32.5 G/DL (ref 31.5–35.7)
MCV RBC AUTO: 96.2 FL (ref 79–97)
PLATELET # BLD AUTO: 255 10*3/MM3 (ref 140–450)
PMV BLD AUTO: 9.8 FL (ref 6–12)
POTASSIUM SERPL-SCNC: 3.9 MMOL/L (ref 3.5–5.2)
RBC # BLD AUTO: 2.91 10*6/MM3 (ref 3.77–5.28)
SODIUM SERPL-SCNC: 136 MMOL/L (ref 136–145)
WBC NRBC COR # BLD: 13 10*3/MM3 (ref 3.4–10.8)

## 2022-01-04 PROCEDURE — 82306 VITAMIN D 25 HYDROXY: CPT | Performed by: INTERNAL MEDICINE

## 2022-01-04 PROCEDURE — 97110 THERAPEUTIC EXERCISES: CPT

## 2022-01-04 PROCEDURE — 99233 SBSQ HOSP IP/OBS HIGH 50: CPT | Performed by: INTERNAL MEDICINE

## 2022-01-04 PROCEDURE — 92610 EVALUATE SWALLOWING FUNCTION: CPT

## 2022-01-04 PROCEDURE — 85027 COMPLETE CBC AUTOMATED: CPT | Performed by: INTERNAL MEDICINE

## 2022-01-04 PROCEDURE — 99253 IP/OBS CNSLTJ NEW/EST LOW 45: CPT | Performed by: ORTHOPAEDIC SURGERY

## 2022-01-04 PROCEDURE — 71250 CT THORAX DX C-: CPT

## 2022-01-04 PROCEDURE — 80048 BASIC METABOLIC PNL TOTAL CA: CPT | Performed by: INTERNAL MEDICINE

## 2022-01-04 PROCEDURE — 25010000002 MORPHINE PER 10 MG: Performed by: INTERNAL MEDICINE

## 2022-01-04 PROCEDURE — 25010000002 ENOXAPARIN PER 10 MG: Performed by: INTERNAL MEDICINE

## 2022-01-04 RX ADMIN — SIMETHICONE 80 MG: 80 TABLET, CHEWABLE ORAL at 10:25

## 2022-01-04 RX ADMIN — METOPROLOL SUCCINATE 25 MG: 25 TABLET, FILM COATED, EXTENDED RELEASE ORAL at 10:25

## 2022-01-04 RX ADMIN — BENZONATATE 100 MG: 100 CAPSULE ORAL at 06:38

## 2022-01-04 RX ADMIN — MORPHINE SULFATE 2 MG: 2 INJECTION, SOLUTION INTRAMUSCULAR; INTRAVENOUS at 06:38

## 2022-01-04 RX ADMIN — SENNOSIDES AND DOCUSATE SODIUM 2 TABLET: 50; 8.6 TABLET ORAL at 20:53

## 2022-01-04 RX ADMIN — Medication 5 MG: at 20:53

## 2022-01-04 RX ADMIN — ACETAMINOPHEN 1000 MG: 500 TABLET, FILM COATED ORAL at 20:53

## 2022-01-04 RX ADMIN — POLYETHYLENE GLYCOL 3350 17 G: 17 POWDER, FOR SOLUTION ORAL at 20:53

## 2022-01-04 RX ADMIN — SODIUM CHLORIDE, PRESERVATIVE FREE 10 ML: 5 INJECTION INTRAVENOUS at 21:00

## 2022-01-04 RX ADMIN — SIMETHICONE 80 MG: 80 TABLET, CHEWABLE ORAL at 16:30

## 2022-01-04 RX ADMIN — OXYCODONE HYDROCHLORIDE 10 MG: 5 TABLET ORAL at 20:53

## 2022-01-04 RX ADMIN — OXYCODONE HYDROCHLORIDE 10 MG: 5 TABLET ORAL at 10:25

## 2022-01-04 RX ADMIN — MORPHINE SULFATE 2 MG: 2 INJECTION, SOLUTION INTRAMUSCULAR; INTRAVENOUS at 02:59

## 2022-01-04 RX ADMIN — ACETAMINOPHEN 1000 MG: 500 TABLET, FILM COATED ORAL at 10:25

## 2022-01-04 RX ADMIN — FAMOTIDINE 40 MG: 20 TABLET ORAL at 10:25

## 2022-01-04 RX ADMIN — SODIUM CHLORIDE, PRESERVATIVE FREE 10 ML: 5 INJECTION INTRAVENOUS at 10:27

## 2022-01-04 RX ADMIN — ENOXAPARIN SODIUM 40 MG: 40 INJECTION SUBCUTANEOUS at 10:25

## 2022-01-04 RX ADMIN — LIDOCAINE 1 PATCH: 50 PATCH CUTANEOUS at 10:26

## 2022-01-04 RX ADMIN — SENNOSIDES AND DOCUSATE SODIUM 2 TABLET: 50; 8.6 TABLET ORAL at 10:25

## 2022-01-04 RX ADMIN — ACETAMINOPHEN 1000 MG: 500 TABLET, FILM COATED ORAL at 16:30

## 2022-01-04 NOTE — PROGRESS NOTES
[facility]   Hospitalist Progress Note  Date: 2022  Patient Name: Sarina Trevino  : 1932  MRN: [id number]  Date of admission: 2022      Subjective   Subjective     Chief Complaint: follow up for left hip pain    Summary: 89 y/o F with CAD, HTN who presented presented following syncopal event after getting up from a seated position and experiencing severe pain in the left hip. Found to have acute fractures of the left superior and inferior pubic rami.     Interval Followup: Today feels bloated, does have issues with ongoing bloating at home for which she takes Metamucil.  Has been passing flatus but no bowel movement since admission.  Has difficulty swallowing pills which she says has been going on for about a year, very seldomly does she have to cough anything back up.  Feeling overall poorly, is agreeable to CT scan later today but is concerned about pain control and desires to have a bath beforehand.    Review of Systems   No nausea no vomiting no diarrhea, no chest pain    Objective   Objective     Vitals:   Temp:  [98 °F (36.7 °C)-99.6 °F (37.6 °C)] 98 °F (36.7 °C)  Heart Rate:  [83-95] 95  Resp:  [16-20] 20  BP: (117-159)/(47-69) 139/69  Physical Exam   Gen: NAD, WDWN elderly female sitting up supported in the bed  HEENT: NCAT, mmm  Resp: CTAB no wrr, no dyspnea  CV: RRR no mrg, no LE edema  GI: Abdomen soft mildly tender to palpation left lower quadrant, mild distention, normoactive bowel sounds  Psych: AOx3, normal mood and affect  : Shepherd in place     Result Review    Result Review:  I have personally reviewed the results from the time of this admission to 2022 10:26 EST and agree with these findings:  [x]  Laboratory  Creatinine 1  Troponin 0 0.017  White count 18 improved to 13  Hemoglobin 11 down to 9  []  Microbiology  [x]  Radiology  Chest x-ray: 2.3 cm nodule lateral aspect of the right midlung, lytic lesion of the proximal right humerus  [x]  EKG/Telemetry normal sinus  rhythm in the 90s  []  Cardiology/Vascular   []  Pathology  []  Old records  []  Other:    Assessment/Plan   Assessment / Plan     Assessment:  Pelvic fracture  Osteopenia  Syncope, vasovagal  Essential hypertension  2.3 cm nodule right lung  Lytic lesion of the proximal right humerus  Dysphagia        Plan:  Orthopedic surgery consulted, pending evaluation   Keep nonweightbearing until official recommendations from orthopedic surgery  Scheduled Tylenol, oral oxycodone for pain, IV morphine for severe pain, lidocaine patches  Ordering CT chest with contrast to evaluate mass and lytic lesion  Continue bowel regimen, monitor for bowel movement  PT OT, will need rehab  Likely has osteoporosis, may benefit from bisphosphonate as an outpatient, pending vitamin D level, will need calcium supplementation as well  Echocardiogram ordered, syncopal event does sound vasovagal  Hold HCTZ, aspirin, telmisartan for now  Continue home metoprolol  Speech therapy for dysphagia   Shepherd catheter in place until after orthopedic evaluation  Monitor Hgb    DVT prophylaxis: Lovenox  Diet: Cardiac  Plan discussed with bedside RN

## 2022-01-04 NOTE — CONSULTS
[facility]   Consult Note    Patient Name: Sarina Trevino  : 1932  MRN: [id number]  Primary Care Physician:  Provider, No Known  Referring Physician: No ref. provider found  Date of admission: 2022    Subjective   Subjective     Reason for Consult/ Chief Complaint: Left hip pain    HPI:  Sarina Trevino is a 89 y.o. female who fell  while going for bathroom to her bedroom she is unable to get for EMS was called she was diagnosed with left superior and inferior ramus fracture admitted to the hospital was consulted    Review of Systems   14 Point ROS is negative except as noted above.     Personal History     Past Medical History:   Diagnosis Date   • Anxiety    • CAD 10/20/2021   • Cancer (HCC)    • Essential hypertension 10/20/2021   • Hyperlipidemia LDL goal <70 10/20/2021   • Pneumonia        History reviewed. No pertinent surgical history.    Family History: family history is not on file. Otherwise pertinent FHx was reviewed and not pertinent to current issue.    Social History:  reports that she has never smoked. She has never used smokeless tobacco. She reports that she does not drink alcohol and does not use drugs.    Home Medications:  acetaminophen, aspirin, hydroCHLOROthiazide, metoprolol succinate XL, and telmisartan    Allergies:  Allergies   Allergen Reactions   • Meloxicam Dizziness   • Codeine Nausea And Vomiting       Objective    Objective     Vitals:   Temp:  [98 °F (36.7 °C)-98.8 °F (37.1 °C)] 98 °F (36.7 °C)  Heart Rate:  [83-95] 90  Resp:  [18-20] 18  BP: (117-159)/(47-73) 126/73    Physical Exam:   Constitutional: Awake, alert   HENT: Atraumatic, Normocephalic   Respiratory: Nonlabored respirations    Cardiovascular: Intact peripheral pulses    Musculoskeletal: Clavicles shoulders hands are nontender full range of motion tender to palpation laterally with some mild groin pain with internal rotation of the hip the left knee and ankles nontender right hip knee and ankles  nontender full range of motion     Imaging:  Imaging Results (Last 24 Hours)     Procedure Component Value Units Date/Time    CT Chest Without Contrast Diagnostic [[id number]] Resulted: 01/04/22 1445     Updated: 01/04/22 1445           Result Review    Result Review:  I have personally reviewed the results from the time of this admission to 1/4/2022 14:52 EST and agree with these findings:  []  Laboratory  []  Microbiology  []  Radiology  []  EKG/Telemetry   []  Cardiology/Vascular   []  Pathology  []  Old records  []  Other:      Assessment/Plan   Assessment / Plan     Brief Patient Summary:  Sarina Trevino is a 89 y.o. female who has a pelvis fracture, left superior and inferior ramus    Active Hospital Problems:  Active Hospital Problems    Diagnosis    • **Pelvic fracture (HCC)    • Osteopenia    • Degenerative arthritis of lumbar spine    • Hypokalemia    • Syncope    • Left hip pain    • CAD    • Essential hypertension        Plan: Plan is for rehab weight-bear as tolerated with walker      Electronically signed by Kalpesh Mac MD, 01/04/22, 2:52 PM EST.

## 2022-01-04 NOTE — PLAN OF CARE
Goal Outcome Evaluation:  Plan of Care Reviewed With: patient    Patient exhibits with mild dysphagia requiring continuation of skilled speech pathology services to maximize her level of function.  Recommend diet of regular solids and thin liquids with added moisture.  Alternate solids and liquids, medications whole in applesauce/pudding.

## 2022-01-04 NOTE — THERAPY TREATMENT NOTE
Acute Care - Physical Therapy Treatment Note  [facility]     Patient Name: Sarina Trevino  : 1932  MRN: [id number]  Today's Date: 2022      Visit Dx:     ICD-10-CM ICD-9-CM   1. Closed nondisplaced fracture of pelvis, unspecified part of pelvis, sequela  S32.9XXS 905.1   2. Difficulty in walking  R26.2 719.7     Patient Active Problem List   Diagnosis   • CAD   • Essential hypertension   • Hyperlipidemia LDL goal <70   • Allergic rhinitis   • Arthritis   • Pelvic fracture (HCC)   • Osteopenia   • Degenerative arthritis of lumbar spine   • Hypokalemia   • Syncope   • Left hip pain     Past Medical History:   Diagnosis Date   • Anxiety    • CAD 10/20/2021   • Cancer (HCC)    • Essential hypertension 10/20/2021   • Hyperlipidemia LDL goal <70 10/20/2021   • Pneumonia      History reviewed. No pertinent surgical history.  PT Assessment (last 12 hours)     PT Evaluation and Treatment     Row Name 22 0859          Physical Therapy Time and Intention    Subjective Information --  Pt agreeable to therex but declines transfers.  -     Document Type therapy note (daily note)  -     Mode of Treatment physical therapy; individual therapy  -     Patient Effort fair  -     Row Name 22 0859          Pain Scale: FACES Pre/Post-Treatment    Pain: FACES Scale, Pretreatment --  3/10  -     Posttreatment Pain Rating --  3/10  -     Row Name 22 0859          Range of Motion (ROM)    Range of Motion --  Pt B hip AAROM at 60 degrees flex and 15 degrees abd.  -     Row Name 22 0859          Strength (Manual Muscle Testing)    Strength (Manual Muscle Testing) --  Pt B hip flex strength at 2-/5.  -     Row Name 22 0859          Progress Summary (PT)    Progress Toward Functional Goals (PT) progress toward functional goals is gradual  -           User Key  (r) = Recorded By, (t) = Taken By, (c) = Cosigned By    Initials Name Provider Type     Dexter Boyd PTA Physical Therapy  Assistant               Bilateral Lower Extremity   Exercise  Reps  Sets    Short arc quads   10 2   Heel slides  10 2   Ankle pumps  10 2   Quad sets  10 2   Straight leg raise  10 2   Hip ab/adduction 10 2        Physical Therapy Education                 Title: PT OT SLP Therapies (Done)     Topic: Physical Therapy (Done)     Point: Mobility training (Done)     Learning Progress Summary           Patient Acceptance, E,TB, VU by ANDRE at 1/3/2022 1107                   Point: Precautions (Done)     Learning Progress Summary           Patient Acceptance, E,TB, VU by ANDRE at 1/3/2022 1107                               User Key     Initials Effective Dates Name Provider Type Discipline    ANDRE 06/03/21 -  Sonny Magaña, PT Physical Therapist PT              PT Recommendation and Plan     Progress Summary (PT)  Progress Toward Functional Goals (PT): progress toward functional goals is gradual   Outcome Measures     Row Name 01/04/22 0900 01/03/22 1100          How much help from another person do you currently need...    Turning from your back to your side while in flat bed without using bedrails? 2  -RH 2  -ANDRE     Moving from lying on back to sitting on the side of a flat bed without bedrails? 2  -RH 2  -ANDRE     Moving to and from a bed to a chair (including a wheelchair)? 2  -RH 2  -ANDRE     Standing up from a chair using your arms (e.g., wheelchair, bedside chair)? 2  -RH 2  -ANDRE     Climbing 3-5 steps with a railing? 1  -RH 1  -ANDRE     To walk in hospital room? 1  -RH 2  -ANDRE     AM-PAC 6 Clicks Score (PT) 10  -RH 11  -ANDRE            Functional Assessment    Outcome Measure Options -- AM-PAC 6 Clicks Basic Mobility (PT)  -ANDRE           User Key  (r) = Recorded By, (t) = Taken By, (c) = Cosigned By    Initials Name Provider Type    RH Dexter Boyd PTA Physical Therapy Assistant    Sonny Colmenares, PT Physical Therapist                 Time Calculation:    PT Charges     Row Name 01/04/22 0857             Time Calculation     PT Received On 01/04/22  -RH              Timed Charges    26800 - PT Therapeutic Exercise Minutes 24  -RH              Total Minutes    Timed Charges Total Minutes 24  -RH       Total Minutes 24  -RH            User Key  (r) = Recorded By, (t) = Taken By, (c) = Cosigned By    Initials Name Provider Type    Dexter Morales PTA Physical Therapy Assistant              Therapy Charges for Today     Code Description Service Date Service Provider Modifiers Qty    98119740026 HC PT THER PROC EA 15 MIN 1/4/2022 Dexter Boyd PTA GP 2          PT G-Codes  Outcome Measure Options: AM-PAC 6 Clicks Basic Mobility (PT)  AM-PAC 6 Clicks Score (PT): 10    Dexter Boyd PTA  1/4/2022

## 2022-01-04 NOTE — THERAPY EVALUATION
"Acute Care - Speech Language Pathology   Swallow Initial Evaluation [facility]     Patient Name: Sarina Trevino  : 1932  MRN: [id number]  Today's Date: 2022               Admit Date: 2022    Visit Dx:     ICD-10-CM ICD-9-CM   1. Closed nondisplaced fracture of pelvis, unspecified part of pelvis, sequela  S32.9XXS 905.1   2. Difficulty in walking  R26.2 719.7   3. Oropharyngeal dysphagia  R13.12 787.22     Patient Active Problem List   Diagnosis   • CAD   • Essential hypertension   • Hyperlipidemia LDL goal <70   • Allergic rhinitis   • Arthritis   • Pelvic fracture (HCC)   • Osteopenia   • Degenerative arthritis of lumbar spine   • Hypokalemia   • Syncope   • Left hip pain     Past Medical History:   Diagnosis Date   • Anxiety    • CAD 10/20/2021   • Cancer (HCC)    • Essential hypertension 10/20/2021   • Hyperlipidemia LDL goal <70 10/20/2021   • Pneumonia      History reviewed. No pertinent surgical history.      Inpatient Speech Pathology Dysphagia Evaluation        PAIN SCALE: None indicated    PRECAUTIONS/CONTRAINDICATIONS: Standard, fall    SUSPECTED ABUSE/NEGLECT/EXPLOITATION: None identified    SOCIAL/PSYCHOLOGICAL NEEDS/BARRIERS: None identified    PAST SOCIAL HISTORY: 89-year-old female, lives at home alone    PRIOR FUNCTION: Patient states difficulty swallowing pills and certain solid consistencies \"in the last year\"    PATIENT GOALS/EXPECTATIONS: To feel better and return home    HISTORY: Patient is an 89-year-old female referred for speech pathology dysphagia evaluation due to complaint of difficulty swallowing and taking medications. Patient reports difficulty swallowing in the last year however has become increasingly worse \"in the last few days\". Nursing reports having to cut her medications in half however not having to crush at this time. Patient states that she continues to have difficulty swallowing even her split medications when taking them with water. Also reports getting " "\"choked\" on certain solid consistencies. X-ray revealed nodule in the lateral aspect of the right midlung. No prior speech pathology services.    CURRENT DIET LEVEL: Regular    OBJECTIVE:    TEST ADMINISTERED: Clinical dysphagia evaluation    COGNITION/SAFETY AWARENESS: Appears appropriate for environment    BEHAVIORAL OBSERVATIONS: Pleasant, alert and cooperative    ORAL MOTOR EXAM: Within normal limits    VOICE QUALITY: Clear    REFLEX EXAM: Within normal limits    POSTURE: Sitting fully upright    FEEDING/SWALLOWING FUNCTION: Assessed with thin liquids, nectar thick liquids, purée solids, regular solids    CLINICAL OBSERVATIONS: Nectar thick liquids by spoon and by cup.  Swallow completed with mild delay.  Vocal quality clear and laryngeal sounds clear with cervical auscultation.  Thin liquids by spoon and by cup.  Swallows completed mild delay.  Vocal quality clear laryngeal sounds clear with cervical auscultation.  Purée solids with lingual pumping with swallow completed.  Throat clearing observed with subsequent swallow.  Regular solids with extended chewing.  Swallow completed.  Throat clearing observed however laryngeal sounds clear to auscultation.  Patient stating feeling like consistency was \"stuck\", pointing to mid throat.  Liquid wash or nectar thickened liquid by straw with swallow completed.  Patient stating that sensation \"went away\".  Thin liquids by single sip via straw.  Swallow completed with mild delay.  Multiple sips of thin liquids via straw with swallows completed with throat clearing and delayed cough.  Repeat trial of controlled drink of thin liquid via straw with swallow completed.  Laryngeal elevation was noted palpation.  Patient exhibiting signs symptoms aspiration with free drinking of thin liquid via straw.  No overt signs or symptoms of aspiration with controlled sips via cup and straw.  Silent aspiration cannot be ruled out at bedside    DYSPHAGIA CRITERIA: Risk of aspiration, " swallow delay, globus sensation with solids    FUNCTIONAL ASSESSMENT INSTRUMENT: Patient currently scored a level 6 of 7 on Functional Communication Measures for swallowing indicating a 1-19% limitation in function.    ASSESSMENT/ PLAN OF CARE:  Pt presents with limitations, noted below, that impede patient's ability to tolerate regular consistency diet safely and independently. The skills of a therapist will be required to safely and effectively implement the following treatment plan to restore maximal level of function.    PROBLEMS:  1.  Risk of aspiration   LTG 1: 30 days.  Patient will increase functional communication measures for swallowing to level 7 of 7, indicating a 0% limitation in function.   STG 1a: 14 days.  Patient will tolerate least restrictive diet of regular soft solids and thin liquids utilizing strategies independently.   STG 1b: 14 days.  Patient will tolerate least restrictive diet of regular soft solids and thin liquids utilizing strategies with minimal cueing.   STG 1c: 14 days.  Patient will tolerate least restrictive diet of regular soft solids and thin liquids with minimal to no signs or symptoms of aspiration.   TREATMENT: Speech therapy for dysphagia, education of strategies and tolerance of least restrictive diet.      FREQUENCY/DURATION: Daily, 5 days a week    REHAB POTENTIAL:  Pt has good rehab potential.  The following limitations may influence improvement/ length of tx medical status.    RECOMMENDATIONS:   1.   DIET: Regular soft solids, thin liquids, extra sauces and gravy/added moisture    2.  POSITION: Fully upright for all p.o. intake, 30 minutes following    3.  COMPENSATORY STRATEGIES: Single controlled sips of thin liquids, alternate solids and liquids, meds whole in applesauce or pudding    Pt/responsible party agrees with plan of care and has been informed of all alternatives, risks and benefits.  SLP Recommendation and Plan                          Anticipated Discharge  Disposition (SLP): skilled nursing facility, inpatient rehabilitation facility (01/04/22 1028)                                           Plan of Care Reviewed With: patient          EDUCATION  The patient has been educated in the following areas:   Dysphagia (Swallowing Impairment).              Time Calculation:    Time Calculation- SLP     Row Name 01/04/22 1031             Time Calculation- SLP    SLP Start Time 0920  -SN      SLP Stop Time 1020  -SN      SLP Time Calculation (min) 60 min  -SN      SLP Received On 01/04/22  -SN              Untimed Charges    15690-ER Eval Oral Pharyng Swallow Minutes 60  -SN              Total Minutes    Untimed Charges Total Minutes 60  -SN       Total Minutes 60  -SN            User Key  (r) = Recorded By, (t) = Taken By, (c) = Cosigned By    Initials Name Provider Type    Marcelle Hall SLP Speech and Language Pathologist                Therapy Charges for Today     Code Description Service Date Service Provider Modifiers Qty    10645251035 HC ST EVAL ORAL PHARYNG SWALLOW 4 1/4/2022 Marcelle Gomez SLP GN 1               MINI Bell  1/4/2022

## 2022-01-04 NOTE — PLAN OF CARE
Goal Outcome Evaluation:   Pt complains of intermittent nausea and constant abdominal discomfort, gas related. Nausea controlled with PRN zofran. Gas discomfort controlled by mylicon chewables. Pain control also remains main concern throughout the night. Pt medicated for pain x3. Pt continues to have difficulty swallowing liquids, medications, and foods at times. Pt chokes and states she feels that everything gets stuck in her throat. She states that this has been an ongoing issue for the past year or more. Due to this, pain medication has been primarily given intravenously. Pt also consistently has coughing episodes causing her to choke. Tessalon pearls given x1. No concerns otherwise.

## 2022-01-05 ENCOUNTER — APPOINTMENT (OUTPATIENT)
Dept: CARDIOLOGY | Facility: HOSPITAL | Age: 87
End: 2022-01-05

## 2022-01-05 LAB
ANION GAP SERPL CALCULATED.3IONS-SCNC: 7.7 MMOL/L (ref 5–15)
BUN SERPL-MCNC: 23 MG/DL (ref 8–23)
BUN/CREAT SERPL: 23 (ref 7–25)
CALCIUM SPEC-SCNC: 8.3 MG/DL (ref 8.6–10.5)
CHLORIDE SERPL-SCNC: 102 MMOL/L (ref 98–107)
CO2 SERPL-SCNC: 24.3 MMOL/L (ref 22–29)
CREAT SERPL-MCNC: 1 MG/DL (ref 0.57–1)
DEPRECATED RDW RBC AUTO: 45.8 FL (ref 37–54)
ERYTHROCYTE [DISTWIDTH] IN BLOOD BY AUTOMATED COUNT: 13.1 % (ref 12.3–15.4)
GFR SERPL CREATININE-BSD FRML MDRD: 52 ML/MIN/1.73
GLUCOSE SERPL-MCNC: 108 MG/DL (ref 65–99)
HCT VFR BLD AUTO: 27 % (ref 34–46.6)
HGB BLD-MCNC: 8.9 G/DL (ref 12–15.9)
MCH RBC QN AUTO: 31.7 PG (ref 26.6–33)
MCHC RBC AUTO-ENTMCNC: 33 G/DL (ref 31.5–35.7)
MCV RBC AUTO: 96.1 FL (ref 79–97)
PLATELET # BLD AUTO: 244 10*3/MM3 (ref 140–450)
PMV BLD AUTO: 9.8 FL (ref 6–12)
POTASSIUM SERPL-SCNC: 3.9 MMOL/L (ref 3.5–5.2)
RBC # BLD AUTO: 2.81 10*6/MM3 (ref 3.77–5.28)
SODIUM SERPL-SCNC: 134 MMOL/L (ref 136–145)
WBC NRBC COR # BLD: 9.13 10*3/MM3 (ref 3.4–10.8)

## 2022-01-05 PROCEDURE — 80048 BASIC METABOLIC PNL TOTAL CA: CPT | Performed by: INTERNAL MEDICINE

## 2022-01-05 PROCEDURE — 25010000002 ONDANSETRON PER 1 MG: Performed by: PHYSICIAN ASSISTANT

## 2022-01-05 PROCEDURE — 85027 COMPLETE CBC AUTOMATED: CPT | Performed by: INTERNAL MEDICINE

## 2022-01-05 PROCEDURE — 99233 SBSQ HOSP IP/OBS HIGH 50: CPT | Performed by: INTERNAL MEDICINE

## 2022-01-05 PROCEDURE — U0003 INFECTIOUS AGENT DETECTION BY NUCLEIC ACID (DNA OR RNA); SEVERE ACUTE RESPIRATORY SYNDROME CORONAVIRUS 2 (SARS-COV-2) (CORONAVIRUS DISEASE [COVID-19]), AMPLIFIED PROBE TECHNIQUE, MAKING USE OF HIGH THROUGHPUT TECHNOLOGIES AS DESCRIBED BY CMS-2020-01-R: HCPCS | Performed by: INTERNAL MEDICINE

## 2022-01-05 PROCEDURE — 25010000002 ENOXAPARIN PER 10 MG: Performed by: INTERNAL MEDICINE

## 2022-01-05 PROCEDURE — 97530 THERAPEUTIC ACTIVITIES: CPT

## 2022-01-05 RX ORDER — LOSARTAN POTASSIUM 50 MG/1
100 TABLET ORAL
Status: DISCONTINUED | OUTPATIENT
Start: 2022-01-05 | End: 2022-01-08

## 2022-01-05 RX ADMIN — FAMOTIDINE 40 MG: 20 TABLET ORAL at 09:10

## 2022-01-05 RX ADMIN — ONDANSETRON 4 MG: 2 INJECTION INTRAMUSCULAR; INTRAVENOUS at 09:53

## 2022-01-05 RX ADMIN — SODIUM CHLORIDE, PRESERVATIVE FREE 10 ML: 5 INJECTION INTRAVENOUS at 20:52

## 2022-01-05 RX ADMIN — ACETAMINOPHEN 1000 MG: 500 TABLET, FILM COATED ORAL at 09:10

## 2022-01-05 RX ADMIN — LOSARTAN POTASSIUM 100 MG: 50 TABLET, FILM COATED ORAL at 09:10

## 2022-01-05 RX ADMIN — SENNOSIDES AND DOCUSATE SODIUM 2 TABLET: 50; 8.6 TABLET ORAL at 09:10

## 2022-01-05 RX ADMIN — ACETAMINOPHEN 1000 MG: 500 TABLET, FILM COATED ORAL at 15:14

## 2022-01-05 RX ADMIN — SODIUM CHLORIDE, PRESERVATIVE FREE 10 ML: 5 INJECTION INTRAVENOUS at 09:11

## 2022-01-05 RX ADMIN — METOPROLOL SUCCINATE 25 MG: 25 TABLET, FILM COATED, EXTENDED RELEASE ORAL at 09:10

## 2022-01-05 RX ADMIN — SIMETHICONE 80 MG: 80 TABLET, CHEWABLE ORAL at 09:10

## 2022-01-05 RX ADMIN — ENOXAPARIN SODIUM 40 MG: 40 INJECTION SUBCUTANEOUS at 09:10

## 2022-01-05 RX ADMIN — ACETAMINOPHEN 1000 MG: 500 TABLET, FILM COATED ORAL at 20:52

## 2022-01-05 RX ADMIN — LIDOCAINE 1 PATCH: 50 PATCH CUTANEOUS at 09:11

## 2022-01-05 NOTE — PLAN OF CARE
Goal Outcome Evaluation:   Pt stable throughout the night. Pt complains of still being unable to have a bowel movement. She was given additional medication to assist with bowels. Pt given medications split and with applesauce or pudding, tolerated well. Pt medicated for pain x1. No other concerns noted.

## 2022-01-05 NOTE — PLAN OF CARE
Goal Outcome Evaluation:   Patient remained stable this shift.  Patient was visited by Care Coordinator for rehab facility of her choice and is in agreement with plan of care to discharge to rehab center close to her Sisters home.  Patients Sister requesting to be notified when updates are available regarding Patient's discharge.  Patient tolerated medications and food well this shift.  No s/s of acute distress or changes noted throughout this shift.

## 2022-01-05 NOTE — NURSING NOTE
Patient refused to have ECHO. Dr. Anders notified with NNO at this time. Order read back and verified.

## 2022-01-05 NOTE — THERAPY TREATMENT NOTE
Acute Care - Physical Therapy Treatment Note  [facility]     Patient Name: Sarina Trevino  : 1932  MRN: [id number]  Today's Date: 2022      Visit Dx:     ICD-10-CM ICD-9-CM   1. Closed nondisplaced fracture of pelvis, unspecified part of pelvis, sequela  S32.9XXS 905.1   2. Difficulty in walking  R26.2 719.7   3. Oropharyngeal dysphagia  R13.12 787.22     Patient Active Problem List   Diagnosis   • CAD   • Essential hypertension   • Hyperlipidemia LDL goal <70   • Allergic rhinitis   • Arthritis   • Pelvic fracture (HCC)   • Osteopenia   • Degenerative arthritis of lumbar spine   • Hypokalemia   • Syncope   • Left hip pain     Past Medical History:   Diagnosis Date   • Anxiety    • CAD 10/20/2021   • Cancer (HCC)    • Essential hypertension 10/20/2021   • Hyperlipidemia LDL goal <70 10/20/2021   • Pneumonia      History reviewed. No pertinent surgical history.  PT Assessment (last 12 hours)     PT Evaluation and Treatment     Row Name 22 0916          Physical Therapy Time and Intention    Subjective Information complains of; weakness; fatigue; dizziness; nausea/vomiting; pain  -RH     Document Type therapy note (daily note)  -     Mode of Treatment physical therapy; individual therapy  -     Patient Effort adequate  -     Row Name 22 09          Pain Scale: Word Pre/Post-Treatment    Pain: Word Scale, Pretreatment 4 - moderate pain  -     Posttreatment Pain Rating --  7/10  -RH     Pain Intervention(s) Emotional support; Nursing Notified  -     Row Name 22          Pain Scale: FACES Pre/Post-Treatment    Pain Location --  Pt with gas pain.  -     Row Name 22 0916          Bed Mobility    Bed Mobility supine-sit  -     All Activities, [place] (Bed Mobility) moderate assist (50% patient effort)  -     Supine-Sit [place] (Bed Mobility) moderate assist (50% patient effort)  -     Assistive Device (Bed Mobility) bed rails  -     Comment (Bed  Mobility) --  Pt maintains sitting with CGA/min assist.  -     Row Name 01/05/22 0916          Transfers    Transfers stand pivot/stand step transfer  -     Comment (Transfers) --  Pt with minimal shuffling steps to complete pivot transfer.  -     Row Name 01/05/22 0916          Stand Pivot/Stand Step Transfer    Stand Pivot/Stand Step [place] (Transfers) moderate assist (50% patient effort)  -     Assistive Device (Stand Pivot Stand Step Transfer) --  Pt transfers without AD.  -     Row Name 01/05/22 0916          Progress Summary (PT)    Progress Toward Functional Goals (PT) progress toward functional goals is gradual  -           User Key  (r) = Recorded By, (t) = Taken By, (c) = Cosigned By    Initials Name Provider Type     Dexter Boyd PTA Physical Therapy Assistant                Physical Therapy Education                 Title: PT OT SLP Therapies (Done)     Topic: Physical Therapy (Done)     Point: Mobility training (Done)     Learning Progress Summary           Patient Acceptance, E,TB, VU by ANDRE at 1/3/2022 1107                   Point: Precautions (Done)     Learning Progress Summary           Patient Acceptance, E,TB, VU by ANDRE at 1/3/2022 1107                               User Key     Initials Effective Dates Name Provider Type [facility] 06/03/21 -  Sonny Magaña, PT Physical Therapist PT              PT Recommendation and Plan     Progress Summary (PT)  Progress Toward Functional Goals (PT): progress toward functional goals is gradual   Outcome Measures     Row Name 01/05/22 0900 01/04/22 0900 01/03/22 1100       How much help from another person do you currently need...    Turning from your back to your side while in flat bed without using bedrails? 2  - 2  -RH 2  -ANDRE    Moving from lying on back to sitting on the side of a flat bed without bedrails? 2  -RH 2  -RH 2  -ANDRE    Moving to and from a bed to a chair (including a wheelchair)? 2  -RH 2  -RH 2  -ANDRE    Standing up  from a chair using your arms (e.g., wheelchair, bedside chair)? 2  -RH 2  -RH 2  -ANDRE    Climbing 3-5 steps with a railing? 1  -RH 1  -RH 1  -ANDRE    To walk in hospital room? 2  -RH 1  -RH 2  -ANDRE    AM-PAC 6 Clicks Score (PT) 11  -RH 10  -RH 11  -ANDRE       Functional Assessment    Outcome Measure Options -- -- AM-PAC 6 Clicks Basic Mobility (PT)  -ANDRE          User Key  (r) = Recorded By, (t) = Taken By, (c) = Cosigned By    Initials Name Provider Type     Dexter Boyd PTA Physical Therapy Assistant    Sonny Colmenares, PT Physical Therapist                 Time Calculation:    PT Charges     Row Name 01/05/22 0916             Time Calculation    PT Received On 01/05/22  -RH              Timed Charges    78831 - PT Therapeutic Activity Minutes 24  -RH              Total Minutes    Timed Charges Total Minutes 24  -RH       Total Minutes 24  -RH            User Key  (r) = Recorded By, (t) = Taken By, (c) = Cosigned By    Initials Name Provider Type     Dexter Boyd PTA Physical Therapy Assistant              Therapy Charges for Today     Code Description Service Date Service Provider Modifiers Qty    84169435004 HC PT THER PROC EA 15 MIN 1/4/2022 Dexter Boyd PTA GP 2    52951524652 HC PT THERAPEUTIC ACT EA 15 MIN 1/5/2022 Dexter Boyd PTA GP 2          PT G-Codes  Outcome Measure Options: AM-PAC 6 Clicks Basic Mobility (PT)  AM-PAC 6 Clicks Score (PT): 11    Dexter Boyd PTA  1/5/2022

## 2022-01-05 NOTE — NURSING NOTE
Pt is being moved to [facility] in room 311 due to incoming surgeries. Provider and family was notified about the move, and report has been given to nurse on the floor.

## 2022-01-05 NOTE — PROGRESS NOTES
[facility]   Hospitalist Progress Note  Date: 2022  Patient Name: Sarina Trevino  : 1932  MRN: [id number]  Date of admission: 2022      Subjective   Subjective     Chief Complaint: follow up for left hip pain    Summary: 88 y/o F with CAD, HTN who presented presented following syncopal event after getting up from a seated position and experiencing severe pain in the left hip. Found to have acute fractures of the left superior and inferior pubic rami.  Has some chronic issues with swallowing pills and is willing to work with speech therapy    Interval Followup: I discussed the patient's CT scan and area of concern in her chest, she is not interested in any further work-up or biopsies.  She desires to go to rehab in order to return home.  Patient declined to have her echocardiogram performed today stating that she had enough tests already.  Does have abdominal bloating but this has improved    Review of Systems   No nausea no vomiting no diarrhea, no chest pain    Objective   Objective     Vitals:   Temp:  [98.1 °F (36.7 °C)-99.4 °F (37.4 °C)] 98.2 °F (36.8 °C)  Heart Rate:  [64-91] 84  Resp:  [16-20] 16  BP: (114-172)/(58-76) 147/66  Physical Exam   Gen: NAD, WDWN elderly female lying in bed comfortably  HEENT: NCAT, mmm  Resp: CTAB no wrr, no dyspnea  CV: RRR no mrg, no LE edema  GI: Abdomen soft nontender nondistended left lower quadrant, normoactive bowel sounds  Psych: AOx3, normal mood and affect  : Shepherd in place     Result Review    Result Review:  I have personally reviewed the results from the time of this admission to 2022 16:26 EST and agree with these findings:  [x]  Laboratory  Creatinine 1  White count 18 improved to 9  Hemoglobin 8.9  []  Microbiology  [x]  Radiology  CT chest without contrast: Multifocal nodularity consider bronchiectasis, 2.3 cm nodule, possible lytic lesion in right humerus not visualized  [x]  EKG/Telemetry sinus rhythm 90, no acute events  []   Cardiology/Vascular   []  Pathology  []  Old records  []  Other:    Assessment/Plan   Assessment / Plan     Assessment:  · Pelvic fracture  · Osteopenia  · Syncope, vasovagal  · Essential hypertension  · 2.3 cm nodule right lung  · Lytic lesion of the proximal right humerus  · Dysphagia        Plan:  · PT OT, planning for rehab, awaiting clearance of pre-CERT  · Scheduled Tylenol, oral oxycodone for pain, IV morphine for severe pain, lidocaine patches  · No further work-up in regards to chest CT per patient request  · Likely has osteoporosis, may benefit from bisphosphonate as an outpatient, initiate calcium and vitamin D on discharge  · Echocardiogram declined by the patient  · Hold HCTZ, aspirin  · Resuming Micardis today, monitor blood pressure  · Continue home metoprolol  · Speech therapy for dysphagia   · Shepherd catheter in place until after orthopedic evaluation for now due to fractures    DVT prophylaxis: Lovenox  Diet: Cardiac  Plan discussed with bedside RN

## 2022-01-06 LAB — SARS-COV-2 RNA RESP QL NAA+PROBE: NOT DETECTED

## 2022-01-06 PROCEDURE — 97033 APP MDLTY 1+IONTPHRSIS EA 15: CPT

## 2022-01-06 PROCEDURE — 25010000002 ENOXAPARIN PER 10 MG: Performed by: INTERNAL MEDICINE

## 2022-01-06 PROCEDURE — 92526 ORAL FUNCTION THERAPY: CPT

## 2022-01-06 PROCEDURE — 99233 SBSQ HOSP IP/OBS HIGH 50: CPT | Performed by: INTERNAL MEDICINE

## 2022-01-06 RX ORDER — HYDROCHLOROTHIAZIDE 25 MG/1
25 TABLET ORAL DAILY
Status: DISCONTINUED | OUTPATIENT
Start: 2022-01-06 | End: 2022-01-09 | Stop reason: HOSPADM

## 2022-01-06 RX ORDER — ASPIRIN 81 MG/1
81 TABLET ORAL DAILY
Status: DISCONTINUED | OUTPATIENT
Start: 2022-01-06 | End: 2022-01-09 | Stop reason: HOSPADM

## 2022-01-06 RX ORDER — MELATONIN
1000 DAILY
Status: DISCONTINUED | OUTPATIENT
Start: 2022-01-06 | End: 2022-01-09 | Stop reason: HOSPADM

## 2022-01-06 RX ADMIN — FAMOTIDINE 40 MG: 20 TABLET ORAL at 08:45

## 2022-01-06 RX ADMIN — SODIUM CHLORIDE, PRESERVATIVE FREE 10 ML: 5 INJECTION INTRAVENOUS at 20:26

## 2022-01-06 RX ADMIN — HYDROCHLOROTHIAZIDE 25 MG: 25 TABLET ORAL at 12:51

## 2022-01-06 RX ADMIN — ACETAMINOPHEN 1000 MG: 500 TABLET, FILM COATED ORAL at 20:17

## 2022-01-06 RX ADMIN — ACETAMINOPHEN 1000 MG: 500 TABLET, FILM COATED ORAL at 17:51

## 2022-01-06 RX ADMIN — LOSARTAN POTASSIUM 100 MG: 50 TABLET, FILM COATED ORAL at 08:45

## 2022-01-06 RX ADMIN — LIDOCAINE 1 PATCH: 50 PATCH CUTANEOUS at 08:46

## 2022-01-06 RX ADMIN — BENZONATATE 100 MG: 100 CAPSULE ORAL at 15:53

## 2022-01-06 RX ADMIN — Medication 5 MG: at 20:22

## 2022-01-06 RX ADMIN — OXYCODONE HYDROCHLORIDE 5 MG: 5 TABLET ORAL at 02:42

## 2022-01-06 RX ADMIN — BENZONATATE 100 MG: 100 CAPSULE ORAL at 08:45

## 2022-01-06 RX ADMIN — ENOXAPARIN SODIUM 40 MG: 40 INJECTION SUBCUTANEOUS at 08:46

## 2022-01-06 RX ADMIN — SODIUM CHLORIDE, PRESERVATIVE FREE 10 ML: 5 INJECTION INTRAVENOUS at 12:51

## 2022-01-06 RX ADMIN — SIMETHICONE 80 MG: 80 TABLET, CHEWABLE ORAL at 12:51

## 2022-01-06 RX ADMIN — ASPIRIN 81 MG: 81 TABLET, COATED ORAL at 12:51

## 2022-01-06 RX ADMIN — ACETAMINOPHEN 1000 MG: 500 TABLET, FILM COATED ORAL at 08:45

## 2022-01-06 RX ADMIN — BENZONATATE 100 MG: 100 CAPSULE ORAL at 22:37

## 2022-01-06 RX ADMIN — Medication 1000 UNITS: at 17:51

## 2022-01-06 RX ADMIN — METOPROLOL SUCCINATE 25 MG: 25 TABLET, FILM COATED, EXTENDED RELEASE ORAL at 08:45

## 2022-01-06 RX ADMIN — BENZONATATE 100 MG: 100 CAPSULE ORAL at 00:29

## 2022-01-06 RX ADMIN — Medication 5 MG: at 00:29

## 2022-01-06 RX ADMIN — OXYCODONE HYDROCHLORIDE 10 MG: 5 TABLET ORAL at 23:39

## 2022-01-06 RX ADMIN — Medication 1 TABLET: at 17:51

## 2022-01-06 RX ADMIN — OXYCODONE HYDROCHLORIDE 10 MG: 5 TABLET ORAL at 13:02

## 2022-01-06 NOTE — PLAN OF CARE
Goal Outcome Evaluation:      Covid swabbed for possible rehab placement today. Prn pain medicine given X1 for pain in left hip. No changes overnight. Teresa Castelan RN

## 2022-01-06 NOTE — THERAPY TREATMENT NOTE
Acute Care - Physical Therapy Progress Note  [facility]     Patient Name: Sarina Trevino  : 1932  MRN: [id number]  Today's Date: 2022      Visit Dx:     ICD-10-CM ICD-9-CM   1. Closed nondisplaced fracture of pelvis, unspecified part of pelvis, sequela  S32.9XXS 905.1   2. Difficulty in walking  R26.2 719.7   3. Oropharyngeal dysphagia  R13.12 787.22     Patient Active Problem List   Diagnosis   • CAD   • Essential hypertension   • Hyperlipidemia LDL goal <70   • Allergic rhinitis   • Arthritis   • Pelvic fracture (HCC)   • Osteopenia   • Degenerative arthritis of lumbar spine   • Hypokalemia   • Syncope   • Left hip pain     Past Medical History:   Diagnosis Date   • Anxiety    • CAD 10/20/2021   • Cancer (HCC)    • Essential hypertension 10/20/2021   • Hyperlipidemia LDL goal <70 10/20/2021   • Pneumonia      History reviewed. No pertinent surgical history.  PT Assessment (last 12 hours)     PT Evaluation and Treatment     Row Name 22 1200          Physical Therapy Time and Intention    Subjective Information no complaints  -CS     Document Type therapy note (daily note)  -CS     Mode of Treatment physical therapy; individual therapy  -CS     Patient Effort adequate  -CS     Symptoms Noted During/After Treatment none  -CS     Row Name 22 1200          Bed Mobility    Bed Mobility scooting/bridging; supine-sit-supine  -CS     Scooting/Bridging [place] (Bed Mobility) maximum assist (25% patient effort); 1 person assist; verbal cues; nonverbal cues (demo/gesture)  -CS     Supine-Sit-Supine [place] (Bed Mobility) moderate assist (50% patient effort); 1 person assist; verbal cues; nonverbal cues (demo/gesture)  -CS     Assistive Device (Bed Mobility) bed rails; draw sheet; head of bed elevated  -CS     Row Name 22 1200          Motor Skills    Therapeutic Exercise hip; knee; ankle  -CS     Row Name 22 1200          Hip (Therapeutic Exercise)    Hip (Therapeutic Exercise)  AROM (active range of motion)  -     Hip AROM (Therapeutic Exercise) bilateral; 10 repetitions; 5 repetitions  marches, hip abd/add x 20 reps  -CS     Row Name 01/06/22 1200          Knee (Therapeutic Exercise)    Knee (Therapeutic Exercise) AROM (active range of motion)  -     Knee AROM (Therapeutic Exercise) bilateral; LAQ (long arc quad); 10 repetitions; 5 repetitions  -CS     Row Name 01/06/22 1200          Ankle (Therapeutic Exercise)    Ankle (Therapeutic Exercise) AROM (active range of motion)  -     Ankle AROM (Therapeutic Exercise) bilateral; dorsiflexion; plantarflexion; 10 repetitions; 5 repetitions  -CS     Row Name 01/06/22 1200          Progress Summary (PT)    Daily Progress Summary (PT) Pt. presents in semi rowley position this a.m. upon entering room and pt. was agreeable to PT Tx.  Pt. required significant assistance for bed mobility today.  Exercises performed as per outlined in this progress note.  Pt. declined standing or T/F's today and was assisted back to semi rowley position.  Pt. with call button within reach and bed alarm activated.  -           User Key  (r) = Recorded By, (t) = Taken By, (c) = Cosigned By    Initials Name Provider Type    CS Gage Ashley PTA Physical Therapy Assistant                Physical Therapy Education                 Title: PT OT SLP Therapies (Done)     Topic: Physical Therapy (Done)     Point: Mobility training (Done)     Learning Progress Summary           Patient Acceptance, E, VU by  at 1/5/2022 2218    Acceptance, E,TB, VU by ANDRE at 1/3/2022 1107                   Point: Precautions (Done)     Learning Progress Summary           Patient Acceptance, E, VU by  at 1/5/2022 2218    Acceptance, E,TB, VU by ANDRE at 1/3/2022 1107                               User Key     Initials Effective Dates Name Provider Type Discipline     06/16/21 -  Teresa Castelan, RN Registered Nurse Nurse    ANDRE 06/03/21 -  Sonny Magaña PT Physical Therapist PT               PT Recommendation and Plan     Progress Summary (PT)  Daily Progress Summary (PT): Pt. presents in semi rowley position this a.m. upon entering room and pt. was agreeable to PT Tx.  Pt. required significant assistance for bed mobility today.  Exercises performed as per outlined in this progress note.  Pt. declined standing or T/F's today and was assisted back to semi rowley position.  Pt. with call button within reach and bed alarm activated.   Outcome Measures     Row Name 01/06/22 1200 01/05/22 0900 01/04/22 0900       How much help from another person do you currently need...    Turning from your back to your side while in flat bed without using bedrails? 2  -CS 2  -RH 2  -RH    Moving from lying on back to sitting on the side of a flat bed without bedrails? 2  -CS 2  -RH 2  -RH    Moving to and from a bed to a chair (including a wheelchair)? 2  -CS 2  -RH 2  -RH    Standing up from a chair using your arms (e.g., wheelchair, bedside chair)? 2  -CS 2  -RH 2  -RH    Climbing 3-5 steps with a railing? 1  -CS 1  -RH 1  -RH    To walk in hospital room? 2  -CS 2  -RH 1  -RH    AM-PAC 6 Clicks Score (PT) 11  -CS 11  -RH 10  -RH       Functional Assessment    Outcome Measure Options AM-PAC 6 Clicks Basic Mobility (PT)  -CS -- --          User Key  (r) = Recorded By, (t) = Taken By, (c) = Cosigned By    Initials Name Provider Type     Dexter Boyd PTA Physical Therapy Assistant     Gage Ashley PTA Physical Therapy Assistant                 Time Calculation:    PT Charges     Row Name 01/06/22 1246             Time Calculation    Start Time 1018  -CS      PT Received On 01/06/22  -CS              Timed Charges    09725 - PT Iontophoresis Minutes 15  -CS              Total Minutes    Timed Charges Total Minutes 15  -CS       Total Minutes 15  -CS            User Key  (r) = Recorded By, (t) = Taken By, (c) = Cosigned By    Initials Name Provider Type     Gage Ashley PTA Physical Therapy Assistant               Therapy Charges for Today     Code Description Service Date Service Provider Modifiers Qty    51397201626 HC PT IONTOPHORESIS EA 15 MIN 1/6/2022 Gage Ashley, PTA GP 1          PT G-Codes  Outcome Measure Options: AM-PAC 6 Clicks Basic Mobility (PT)  AM-PAC 6 Clicks Score (PT): 11    Gage Ashley PTA  1/6/2022

## 2022-01-06 NOTE — THERAPY TREATMENT NOTE
Acute Care - Speech Language Pathology   Swallow Treatment Note [facility]     Patient Name: Sarina Trevino  : 1932  MRN: [id number]  Today's Date: 2022               Admit Date: 2022    Visit Dx:     ICD-10-CM ICD-9-CM   1. Closed nondisplaced fracture of pelvis, unspecified part of pelvis, sequela  S32.9XXS 905.1   2. Difficulty in walking  R26.2 719.7   3. Oropharyngeal dysphagia  R13.12 787.22     Patient Active Problem List   Diagnosis   • CAD   • Essential hypertension   • Hyperlipidemia LDL goal <70   • Allergic rhinitis   • Arthritis   • Pelvic fracture (HCC)   • Osteopenia   • Degenerative arthritis of lumbar spine   • Hypokalemia   • Syncope   • Left hip pain     Past Medical History:   Diagnosis Date   • Anxiety    • CAD 10/20/2021   • Cancer (HCC)    • Essential hypertension 10/20/2021   • Hyperlipidemia LDL goal <70 10/20/2021   • Pneumonia      History reviewed. No pertinent surgical history.  SPEECH PATHOLOGY DYSPHAGIA TREATMENT    Subjective/Behavioral Observations: Pleasant, alert and cooperative        Day/time of Treatment: 2022        Current Diet: Mechanical soft, thin liquids        Current Strategies: Small bites and sips, controlled drink, alternate solids and liquids, meds in applesauce or pudding        Treatment received: Treatment focused on utilization of strategies with p.o. intake        Results of treatment: Patient required moderate level of cueing prompts to alternate bites of solids and drink of liquids.  When patient not utilizing alteration of solids and liquids, patient noted with throat clearing with solid consistencies.  Laryngeal sounds however did remain clear to auscultation.  Patient utilizing strategies, no throat clearing observed.  Patient did not require any cues for trial bite-size of soft solids.  Patient able to verbalize compensatory strategies independently.        Progress toward goals: Able to verbalize compensatory strategies however does  require moderate cues for utilization.  When compensatory strategies used, patient able to tolerate her current diet recommendations without any issue.        Barriers to Achieving goals: Medical status/age-related        Plan of care:/changes in plan: Continue per plan of compensatory strategies, positioning and diet recommendations.                SLP Recommendation and Plan                                                                      Plan of Care Reviewed With: patient          EDUCATION  The patient has been educated in the following areas:   Dysphagia (Swallowing Impairment).              Time Calculation:    Time Calculation- SLP     Row Name 01/06/22 1133             Time Calculation- SLP    SLP Stop Time 0830  -SN      SLP Received On 01/06/22  -SN              Untimed Charges    00483-QJ Treatment Swallow Minutes 45  -SN              Total Minutes    Untimed Charges Total Minutes 45  -SN       Total Minutes 45  -SN            User Key  (r) = Recorded By, (t) = Taken By, (c) = Cosigned By    Initials Name Provider Type    Marcelle Hall SLP Speech and Language Pathologist                Therapy Charges for Today     Code Description Service Date Service Provider Modifiers Qty    08395563486 HC ST TREATMENT SWALLOW 3 1/6/2022 Marcelle Gomez SLP GN 1               MINI Bell  1/6/2022

## 2022-01-06 NOTE — PROGRESS NOTES
[facility]   Hospitalist Progress Note  Date: 2022  Patient Name: Sarina Trevino  : 1932  MRN: [id number]  Date of admission: 2022      Subjective   Subjective     Chief Complaint: follow up for left hip pain    Summary: 89 y/o F with CAD, HTN who presented presented following syncopal event after getting up from a seated position and experiencing severe pain in the left hip. Found to have acute fractures of the left superior and inferior pubic rami.  Has some chronic issues with swallowing pills and is willing to work with speech therapy.  Area of concern on CT chest, patient does not want further work-up or biopsies.    Interval Followup: Pain somewhat improved, has been able to get up to the bedside commode.  Chronically uses depends at home due to chronic urinary incontinence    Review of Systems   No nausea no vomiting no diarrhea, no chest pain    Objective   Objective     Vitals:   Temp:  [98.3 °F (36.8 °C)-98.4 °F (36.9 °C)] 98.3 °F (36.8 °C)  Heart Rate:  [80-90] 88  Resp:  [15-16] 16  BP: (147-182)/(64-74) 170/73  Physical Exam   Gen: NAD, WDWN elderly female lying in bed comfortably  HEENT: NCAT, mmm  Resp: CTAB no wrr, no dyspnea  CV: RRR no mrg, no LE edema  GI: Abdomen soft nontender nondistended, normoactive bowel sounds  Psych: AOx3, normal mood and affect  : Shepherd in place     Result Review    Result Review:  I have personally reviewed the results from the time of this admission to 2022 15:29 EST and agree with these findings:  [x]  Laboratory  Creatinine 1   White count 18 improved to 9  Hemoglobin 8.9  []  Microbiology  [x]  Radiology  CT chest without contrast: Multifocal nodularity consider bronchiectasis, 2.3 cm nodule, possible lytic lesion in right humerus not visualized  [x]  EKG/Telemetry sinus rhythm 90, no acute events  []  Cardiology/Vascular   []  Pathology  []  Old records  []  Other:    Assessment/Plan   Assessment / Plan     Assessment:  · Pelvic  fracture  · Osteopenia  · Syncope, vasovagal  · Essential hypertension  · 2.3 cm nodule right lung  · Lytic lesion of the proximal right humerus  · Dysphagia        Plan:  · PT OT, planning for rehab, awaiting pre-CERT at [facility]  · Scheduled Tylenol, oral oxycodone for pain, IV morphine for severe pain, lidocaine patches  · No further work-up in regards to chest CT per patient request  · Likely has osteoporosis, may benefit from bisphosphonate as an outpatient, initiate calcium and vitamin D   · Echocardiogram declined by the patient  · Resume aspirin and HCTZ, continue metoprolol, ARB   · Speech therapy for dysphagia   · DC Shepherd catheter and telemetry    DVT prophylaxis: Lovenox  Diet: Cardiac  Plan discussed with bedside RN

## 2022-01-06 NOTE — PLAN OF CARE
Goal Outcome Evaluation:  Plan of Care Reviewed With: patient        Progress: no change  Outcome Summary: No acute changes with patient. BP elevated in afternoon, MD aware. Continuing to monitor.  Ghazal Villatoro RN

## 2022-01-07 PROCEDURE — 99232 SBSQ HOSP IP/OBS MODERATE 35: CPT | Performed by: INTERNAL MEDICINE

## 2022-01-07 PROCEDURE — 25010000002 ENOXAPARIN PER 10 MG: Performed by: INTERNAL MEDICINE

## 2022-01-07 PROCEDURE — 97530 THERAPEUTIC ACTIVITIES: CPT

## 2022-01-07 RX ADMIN — BENZONATATE 100 MG: 100 CAPSULE ORAL at 16:23

## 2022-01-07 RX ADMIN — ASPIRIN 81 MG: 81 TABLET, COATED ORAL at 10:08

## 2022-01-07 RX ADMIN — Medication 5 MG: at 23:11

## 2022-01-07 RX ADMIN — ACETAMINOPHEN 1000 MG: 500 TABLET, FILM COATED ORAL at 16:27

## 2022-01-07 RX ADMIN — SODIUM CHLORIDE, PRESERVATIVE FREE 10 ML: 5 INJECTION INTRAVENOUS at 10:08

## 2022-01-07 RX ADMIN — HYDROCHLOROTHIAZIDE 25 MG: 25 TABLET ORAL at 10:08

## 2022-01-07 RX ADMIN — METOPROLOL SUCCINATE 25 MG: 25 TABLET, FILM COATED, EXTENDED RELEASE ORAL at 10:07

## 2022-01-07 RX ADMIN — SENNOSIDES AND DOCUSATE SODIUM 2 TABLET: 50; 8.6 TABLET ORAL at 21:21

## 2022-01-07 RX ADMIN — SODIUM CHLORIDE, PRESERVATIVE FREE 10 ML: 5 INJECTION INTRAVENOUS at 21:29

## 2022-01-07 RX ADMIN — ACETAMINOPHEN 1000 MG: 500 TABLET, FILM COATED ORAL at 10:08

## 2022-01-07 RX ADMIN — LIDOCAINE 1 PATCH: 50 PATCH CUTANEOUS at 10:07

## 2022-01-07 RX ADMIN — LOSARTAN POTASSIUM 100 MG: 50 TABLET, FILM COATED ORAL at 10:08

## 2022-01-07 RX ADMIN — Medication 1 TABLET: at 10:08

## 2022-01-07 RX ADMIN — ACETAMINOPHEN 1000 MG: 500 TABLET, FILM COATED ORAL at 21:21

## 2022-01-07 RX ADMIN — Medication 1000 UNITS: at 10:08

## 2022-01-07 RX ADMIN — SIMETHICONE 80 MG: 80 TABLET, CHEWABLE ORAL at 15:20

## 2022-01-07 RX ADMIN — FAMOTIDINE 40 MG: 20 TABLET ORAL at 10:07

## 2022-01-07 RX ADMIN — ENOXAPARIN SODIUM 40 MG: 40 INJECTION SUBCUTANEOUS at 10:07

## 2022-01-07 NOTE — THERAPY TREATMENT NOTE
Acute Care - Physical Therapy Progress Note  [facility]     Patient Name: Sarina Trevino  : 1932  MRN: [id number]  Today's Date: 2022      Visit Dx:     ICD-10-CM ICD-9-CM   1. Closed nondisplaced fracture of pelvis, unspecified part of pelvis, sequela  S32.9XXS 905.1   2. Difficulty in walking  R26.2 719.7   3. Oropharyngeal dysphagia  R13.12 787.22     Patient Active Problem List   Diagnosis   • CAD   • Essential hypertension   • Hyperlipidemia LDL goal <70   • Allergic rhinitis   • Arthritis   • Pelvic fracture (HCC)   • Osteopenia   • Degenerative arthritis of lumbar spine   • Hypokalemia   • Syncope   • Left hip pain     Past Medical History:   Diagnosis Date   • Anxiety    • CAD 10/20/2021   • Cancer (HCC)    • Essential hypertension 10/20/2021   • Hyperlipidemia LDL goal <70 10/20/2021   • Pneumonia      History reviewed. No pertinent surgical history.  PT Assessment (last 12 hours)     PT Evaluation and Treatment     Row Name 22 1600          Physical Therapy Time and Intention    Subjective Information no complaints  -CS     Document Type therapy note (daily note)  -CS     Mode of Treatment physical therapy; individual therapy  -CS     Patient Effort good  -CS     Symptoms Noted During/After Treatment none  -CS     Row Name 22 1600          Bed Mobility    Supine-Sit-Supine [place] (Bed Mobility) verbal cues; nonverbal cues (demo/gesture); minimum assist (75% patient effort); moderate assist (50% patient effort)  -CS     Bed Mobility, Safety Issues decreased use of arms for pushing/pulling; decreased use of legs for bridging/pushing  -CS     Assistive Device (Bed Mobility) bed rails; draw sheet; head of bed elevated  -CS     Row Name 22 1600          Transfers    Transfers sit-stand transfer; stand-sit transfer; toilet transfer  -CS     Sit-Stand [place] (Transfers) verbal cues; nonverbal cues (demo/gesture); minimum assist (75% patient effort); moderate assist (50%  patient effort); 1 person assist  -CS     Stand-Sit [place] (Transfers) verbal cues; nonverbal cues (demo/gesture); minimum assist (75% patient effort); 1 person assist  -CS     Row Name 01/07/22 1600          Sit-Stand Transfer    Assistive Device (Sit-Stand Transfers) walker, front-wheeled  -CS     Row Name 01/07/22 1600          Stand-Sit Transfer    Assistive Device (Stand-Sit Transfers) walker, front-wheeled  -CS     Row Name 01/07/22 1600          Stand Pivot/Stand Step Transfer    Stand Pivot/Stand Step [place] (Transfers) verbal cues; moderate assist (50% patient effort); 1 person assist  -CS     Assistive Device (Stand Pivot Stand Step Transfer) other (see comments)  HHA x 1 provided  -CS     Row Name 01/07/22 1600          Progress Summary (PT)    Daily Progress Summary (PT) Pt. presents in semi rowley position upon entering room and pt. agreeable to PT Tx.  Pt. was assisted to EOB today and performed 2 STS's at EOB with dynamic weight shifting x 2-3 minutes each.  T/F to BSC performed using stand pivot/step T/F with hand held assistance.  Pt. returned to semi rowley position after PT Tx.  Call button provided within reach and all pt. needs met.  -           User Key  (r) = Recorded By, (t) = Taken By, (c) = Cosigned By    Initials Name Provider Type    Gage Nazario PTA Physical Therapy Assistant                Physical Therapy Education                 Title: PT OT SLP Therapies (Done)     Topic: Physical Therapy (Done)     Point: Mobility training (Done)     Learning Progress Summary           Patient Acceptance, E, VU by  at 1/5/2022 2218    Acceptance, E,TB, VU by ANDRE at 1/3/2022 1107                   Point: Precautions (Done)     Learning Progress Summary           Patient Acceptance, E, VU by  at 1/5/2022 2218    Acceptance, E,TB, VU by ANDRE at 1/3/2022 1107                               User Key     Initials Effective Dates Name Provider Type [facility] 06/16/21 -   Teresa Castelan, RN Registered Nurse Nurse    ANDRE 06/03/21 -  Sonny Magaña PT Physical Therapist PT              PT Recommendation and Plan     Progress Summary (PT)  Daily Progress Summary (PT): Pt. presents in semi rowley position upon entering room and pt. agreeable to PT Tx.  Pt. was assisted to EOB today and performed 2 STS's at EOB with dynamic weight shifting x 2-3 minutes each.  T/F to BSC performed using stand pivot/step T/F with hand held assistance.  Pt. returned to semi rowley position after PT Tx.  Call button provided within reach and all pt. needs met.   Outcome Measures     Row Name 01/07/22 1600 01/06/22 1200 01/05/22 0900       How much help from another person do you currently need...    Turning from your back to your side while in flat bed without using bedrails? 3  -CS 2  -CS 2  -RH    Moving from lying on back to sitting on the side of a flat bed without bedrails? 2  -CS 2  -CS 2  -RH    Moving to and from a bed to a chair (including a wheelchair)? 2  -CS 2  -CS 2  -RH    Standing up from a chair using your arms (e.g., wheelchair, bedside chair)? 2  -CS 2  -CS 2  -RH    Climbing 3-5 steps with a railing? 1  -CS 1  -CS 1  -RH    To walk in hospital room? 1  -CS 2  -CS 2  -RH    AM-PAC 6 Clicks Score (PT) 11  -CS 11  -CS 11  -RH       Functional Assessment    Outcome Measure Options AM-PAC 6 Clicks Basic Mobility (PT)  -CS AM-PAC 6 Clicks Basic Mobility (PT)  -CS --          User Key  (r) = Recorded By, (t) = Taken By, (c) = Cosigned By    Initials Name Provider Type    RH Dexter Boyd PTA Physical Therapy Assistant    Gage Nazario PTA Physical Therapy Assistant                 Time Calculation:    PT Charges     Row Name 01/07/22 1618             Time Calculation    Start Time 1414  -CS      PT Received On 01/07/22  -CS              Timed Charges    88151 - PT Therapeutic Activity Minutes 30  -CS              Total Minutes    Timed Charges Total Minutes 30  -CS       Total Minutes  30  -CS            User Key  (r) = Recorded By, (t) = Taken By, (c) = Cosigned By    Initials Name Provider Type    CS Gage Ashley PTA Physical Therapy Assistant              Therapy Charges for Today     Code Description Service Date Service Provider Modifiers Qty    26709504650 HC PT IONTOPHORESIS EA 15 MIN 1/6/2022 Gage Ashley PTA GP 1    32126327044 HC PT THERAPEUTIC ACT EA 15 MIN 1/7/2022 Gage Ashley PTA GP 2          PT G-Codes  Outcome Measure Options: AM-PAC 6 Clicks Basic Mobility (PT)  AM-PAC 6 Clicks Score (PT): 11    Gage Ashley PTA  1/7/2022

## 2022-01-07 NOTE — PROGRESS NOTES
[facility]   Hospitalist Progress Note  Date: 2022  Patient Name: Sarina Trevino  : 1932  MRN: [id number]  Date of admission: 2022      Subjective   Subjective     Chief Complaint: follow up for left hip pain    Summary: 88 y/o F with CAD, HTN who presented presented following syncopal event after getting up from a seated position and experiencing severe pain in the left hip. Found to have acute fractures of the left superior and inferior pubic rami.  Has some chronic issues with swallowing pills and is willing to work with speech therapy.  Area of concern on CT chest, patient does not want further work-up or biopsies.    Interval Followup: Feels more bloated today, feels that having a bowel movement will help her, having some indigestion.  States this is a chronic issue for her.  Also complaining of some left hip pain requesting pain medication    Review of Systems   No nausea no vomiting no diarrhea, no chest pain    Objective   Objective     Vitals:   Temp:  [98.3 °F (36.8 °C)-99.1 °F (37.3 °C)] (P) 99.1 °F (37.3 °C)  Heart Rate:  [81-91] (P) 88  Resp:  [16-18] (P) 18  BP: (152-170)/(68-74) (P) 176/82  Physical Exam   Gen: NAD, WDWN elderly female lying in bed in moderate discomfort  HEENT: Mucous membranes moist, laceration mid forehead healing well  Resp: CTAB no wrr, no dyspnea  CV: RRR no mrg, no LE edema  GI: Abdomen soft mildly tender to palpation, moderately distended, normoactive bowel sounds  Psych: AOx3, normal mood and affect  : Pure wick in place     Result Review    Result Review:  I have personally reviewed the results from the time of this admission to 2022 14:04 EST and agree with these findings:  [x]  Laboratory  Creatinine 1   White count 18 improved to 9  Hemoglobin 8.9  []  Microbiology  [x]  Radiology  CT chest without contrast: Multifocal nodularity consider bronchiectasis, 2.3 cm nodule, possible lytic lesion in right humerus not visualized  []  EKG/Telemetry    []  Cardiology/Vascular   []  Pathology  []  Old records  []  Other:    Assessment/Plan   Assessment / Plan     Assessment:  · Pelvic fracture  · Osteopenia  · Syncope, vasovagal  · Essential hypertension  · 2.3 cm nodule right lung  · Lytic lesion of the proximal right humerus  · Dysphagia        Plan:  · PT OT, planning for rehab, awaiting pre-CERT at [facility]  · Scheduled Tylenol, oral oxycodone for pain, IV morphine for severe pain, lidocaine patches  · No further work-up in regards to chest CT per patient request  · Likely has osteoporosis, may benefit from bisphosphonate as an outpatient, initiate calcium and vitamin D supplementation  · Continue aspirin and HCTZ, continue metoprolol, ARB, blood pressure trend improving  · Speech therapy for dysphagia     DVT prophylaxis: Lovenox  Diet: Cardiac  Plan discussed with bedside RN

## 2022-01-08 PROCEDURE — 99233 SBSQ HOSP IP/OBS HIGH 50: CPT | Performed by: INTERNAL MEDICINE

## 2022-01-08 PROCEDURE — 25010000002 ENOXAPARIN PER 10 MG: Performed by: INTERNAL MEDICINE

## 2022-01-08 PROCEDURE — 97165 OT EVAL LOW COMPLEX 30 MIN: CPT

## 2022-01-08 RX ORDER — SODIUM PHOSPHATE,MONO-DIBASIC 19G-7G/118
1 ENEMA (ML) RECTAL ONCE
Status: COMPLETED | OUTPATIENT
Start: 2022-01-08 | End: 2022-01-09

## 2022-01-08 RX ORDER — TELMISARTAN 80 MG/1
160 TABLET ORAL DAILY
Status: DISCONTINUED | OUTPATIENT
Start: 2022-01-08 | End: 2022-01-09

## 2022-01-08 RX ADMIN — Medication 1000 UNITS: at 09:39

## 2022-01-08 RX ADMIN — ACETAMINOPHEN 1000 MG: 500 TABLET, FILM COATED ORAL at 19:52

## 2022-01-08 RX ADMIN — SENNOSIDES AND DOCUSATE SODIUM 2 TABLET: 50; 8.6 TABLET ORAL at 09:39

## 2022-01-08 RX ADMIN — Medication 5 MG: at 19:52

## 2022-01-08 RX ADMIN — OXYCODONE HYDROCHLORIDE 5 MG: 5 TABLET ORAL at 15:03

## 2022-01-08 RX ADMIN — POLYETHYLENE GLYCOL 3350 17 G: 17 POWDER, FOR SOLUTION ORAL at 16:40

## 2022-01-08 RX ADMIN — OXYCODONE HYDROCHLORIDE 5 MG: 5 TABLET ORAL at 00:14

## 2022-01-08 RX ADMIN — LOSARTAN POTASSIUM 100 MG: 50 TABLET, FILM COATED ORAL at 09:39

## 2022-01-08 RX ADMIN — ENOXAPARIN SODIUM 40 MG: 40 INJECTION SUBCUTANEOUS at 09:38

## 2022-01-08 RX ADMIN — ASPIRIN 81 MG: 81 TABLET, COATED ORAL at 09:40

## 2022-01-08 RX ADMIN — FAMOTIDINE 40 MG: 20 TABLET ORAL at 09:39

## 2022-01-08 RX ADMIN — SENNOSIDES AND DOCUSATE SODIUM 2 TABLET: 50; 8.6 TABLET ORAL at 20:43

## 2022-01-08 RX ADMIN — SODIUM CHLORIDE, PRESERVATIVE FREE 10 ML: 5 INJECTION INTRAVENOUS at 20:43

## 2022-01-08 RX ADMIN — SENNOSIDES AND DOCUSATE SODIUM 2 TABLET: 50; 8.6 TABLET ORAL at 19:52

## 2022-01-08 RX ADMIN — LIDOCAINE 1 PATCH: 50 PATCH CUTANEOUS at 09:38

## 2022-01-08 RX ADMIN — Medication 1 TABLET: at 09:39

## 2022-01-08 RX ADMIN — SODIUM CHLORIDE, PRESERVATIVE FREE 10 ML: 5 INJECTION INTRAVENOUS at 09:40

## 2022-01-08 RX ADMIN — METOPROLOL SUCCINATE 25 MG: 25 TABLET, FILM COATED, EXTENDED RELEASE ORAL at 09:39

## 2022-01-08 RX ADMIN — ACETAMINOPHEN 1000 MG: 500 TABLET, FILM COATED ORAL at 20:43

## 2022-01-08 RX ADMIN — ACETAMINOPHEN 1000 MG: 500 TABLET, FILM COATED ORAL at 09:39

## 2022-01-08 RX ADMIN — HYDROCHLOROTHIAZIDE 25 MG: 25 TABLET ORAL at 09:39

## 2022-01-08 NOTE — THERAPY EVALUATION
Patient Name: Sarina Trevino  : 1932    MRN: [id number]                              Today's Date: 2022       Admit Date: 2022    Visit Dx:     ICD-10-CM ICD-9-CM   1. Closed nondisplaced fracture of pelvis, unspecified part of pelvis, sequela  S32.9XXS 905.1   2. Difficulty in walking  R26.2 719.7   3. Oropharyngeal dysphagia  R13.12 787.22   4. Decreased activities of daily living (ADL)  Z78.9 V49.89     Patient Active Problem List   Diagnosis   • CAD   • Essential hypertension   • Hyperlipidemia LDL goal <70   • Allergic rhinitis   • Arthritis   • Pelvic fracture (HCC)   • Osteopenia   • Degenerative arthritis of lumbar spine   • Hypokalemia   • Syncope   • Left hip pain     Past Medical History:   Diagnosis Date   • Anxiety    • CAD 10/20/2021   • Cancer (HCC)    • Essential hypertension 10/20/2021   • Hyperlipidemia LDL goal <70 10/20/2021   • Pneumonia      History reviewed. No pertinent surgical history.   General Information     Row Name 22 1147          OT Time and Intention    Document Type evaluation  -AC     Mode of Treatment individual therapy; occupational therapy  -     Row Name 22 1147          General Information    Patient Profile Reviewed yes  -AC     Prior Level of Function --  patient reports being (I) with adls and fx'l mobility with AD in the home. she states she used a shower chair in a tub shower for bathing and had a regular toilet in place. pt. states she used a cane and another person holding on to her for community mob  -AC     Existing Precautions/Restrictions fall; weight bearing  -     Barriers to Rehab none identified  -     Row Name 22 1147          Occupational Profile    Reason for Services/Referral (Occupational Profile) Pt. is a 89 year old female admitted for the above diagnosis. Pt. referred to OT services to assess independence with ADLs and adl transfers/fx'l mobility. No previous OT services for current condition.  -     Row Name  01/08/22 1147          Living Environment    Lives With alone  -[facility] Name 01/08/22 1147          Home Main Entrance    Number of Stairs, Main Entrance other (see comments)  multiple steps  -[facility] Name 01/08/22 1147          Cognition    Orientation Status (Cognition) oriented x 3  -[facility] Name 01/08/22 1147          Safety Issues, Functional Mobility    Safety Issues Affecting Function (Mobility) --  none identified  -     Impairments Affecting Function (Mobility) balance; pain; endurance/activity tolerance  -           User Key  (r) = Recorded By, (t) = Taken By, (c) = Cosigned By    Initials Name Provider Type     Margaret Caal OT Occupational Therapist                 Mobility/ADL's     [facility] Name 01/08/22 1201          Bed Mobility    Bed Mobility supine-sit-supine  -     Supine-Sit-Supine [place] (Bed Mobility) verbal cues; nonverbal cues (demo/gesture); moderate assist (50% patient effort)  -     Bed Mobility, Safety Issues decreased use of arms for pushing/pulling; decreased use of legs for bridging/pushing  -     Assistive Device (Bed Mobility) bed rails; draw sheet; head of bed elevated  -[facility] Name 01/08/22 1201          Transfers    Transfers sit-stand transfer  -     Sit-Stand [place] (Transfers) verbal cues; nonverbal cues (demo/gesture); minimum assist (75% patient effort); 1 person assist  -[facility] Name 01/08/22 1201          Sit-Stand Transfer    Assistive Device (Sit-Stand Transfers) --  bed rail  -[facility] Name 01/08/22 1201          Functional Mobility    Functional Mobility- Ind. Level not tested  -[facility] Name 01/08/22 1201          Activities of Daily Living    BADL Assessment/Intervention --  pt. is max/dep for lower body bathing/dressing/toileting. patient is setup for upper body bathing/dressing/grooming  -[facility] Name 01/08/22 1201          Mobility    Extremity Weight-bearing Status left lower extremity  -     Left Lower Extremity  (Weight-bearing Status) weight-bearing as tolerated (WBAT)  -           User Key  (r) = Recorded By, (t) = Taken By, (c) = Cosigned By    Initials Name Provider Type    Margaret Fontanez OT Occupational Therapist               Obj/Interventions     Row Name 01/08/22 1203          Sensory Assessment (Somatosensory)    Sensory Assessment (Somatosensory) sensation intact  -     Row Name 01/08/22 1203          Vision Assessment/Intervention    Visual Impairment/Limitations WFL  -     Row Name 01/08/22 1203          Range of Motion Comprehensive    General Range of Motion bilateral upper extremity ROM WFL  -AC     Comment, General Range of Motion pt. reports some pain/difficulty with RUE shoulder  -     Row Name 01/08/22 1203          Strength Comprehensive (MMT)    Comment, General Manual Muscle Testing (MMT) Assessment biceps wfl,  4/5  -     Row Name 01/08/22 1203          Motor Skills    Motor Skills coordination; functional endurance  -AC     Coordination WFL  -AC     Functional Endurance fair minus  -AC     Row Name 01/08/22 1203          Balance    Balance Assessment standing static balance  -AC     Static Standing Balance moderate impairment  -           User Key  (r) = Recorded By, (t) = Taken By, (c) = Cosigned By    Initials Name Provider Type    Margaret Fontanez OT Occupational Therapist               Goals/Plan     Row Name 01/08/22 1206          Bed Mobility Goal 1 (OT)    Activity/Assistive Device (Bed Mobility Goal 1, OT) bed mobility activities, all  -AC     [place] Level/Cues Needed (Bed Mobility Goal 1, OT) modified independence  -AC     Time Frame (Bed Mobility Goal 1, OT) long term goal (LTG); 10 days  -     Row Name 01/08/22 1206          Transfer Goal 1 (OT)    Activity/Assistive Device (Transfer Goal 1, OT) transfers, all  -AC     [place] Level/Cues Needed (Transfer Goal 1, OT) modified independence  -AC     Time Frame (Transfer Goal 1, OT) long term goal (LTG); 10  days  -AC     Row Name 01/08/22 1206          Bathing Goal 1 (OT)    Activity/Device (Bathing Goal 1, OT) bathing skills, all  -AC     [place] Level/Cues Needed (Bathing Goal 1, OT) modified independence  -AC     Time Frame (Bathing Goal 1, OT) long term goal (LTG); 10 days  -AC     Row Name 01/08/22 1206          Dressing Goal 1 (OT)    Activity/Device (Dressing Goal 1, OT) dressing skills, all  -AC     [place]/Cues Needed (Dressing Goal 1, OT) modified independence  -AC     Time Frame (Dressing Goal 1, OT) long term goal (LTG); 10 days  -AC     Row Name 01/08/22 1206          Toileting Goal 1 (OT)    Activity/Device (Toileting Goal 1, OT) toileting skills, all  -AC     [place] Level/Cues Needed (Toileting Goal 1, OT) modified independence  -AC     Time Frame (Toileting Goal 1, OT) long term goal (LTG); 10 days  -AC     Row Name 01/08/22 1206          Therapy Assessment/Plan (OT)    Planned Therapy Interventions (OT) activity tolerance training; functional balance retraining; occupation/activity based interventions; ROM/therapeutic exercise; transfer/mobility retraining; patient/caregiver education/training; BADL retraining  -           User Key  (r) = Recorded By, (t) = Taken By, (c) = Cosigned By    Initials Name Provider Type    Margaret Fontanez OT Occupational Therapist               Clinical Impression     Row Name 01/08/22 1204          Pain Assessment    Additional Documentation Pain Scale: FACES Pre/Post-Treatment (Group)  -     Row Name 01/08/22 1204          Pain Scale: FACES Pre/Post-Treatment    Pain: FACES Scale, Pretreatment 2-->hurts little bit  -     Posttreatment Pain Rating 2-->hurts little bit  -     Row Name 01/08/22 1204          Plan of Care Review    Plan of Care Reviewed With patient  -AC     Progress no change  -     Outcome Summary Patient presents with balance, endurance, strength, pain limitations that impede his/her ability to perform ADLS. The skills of a  therapist are necessary to maximize independence with ADLs.  -     Row Name 01/08/22 1204          Therapy Assessment/Plan (OT)    Patient/Family Therapy Goal Statement (OT) Patient would like to maximize independence with adls.  -AC     Rehab Potential (OT) good, to achieve stated therapy goals  -     Criteria for Skilled Therapeutic Interventions Met (OT) yes; meets criteria; skilled treatment is necessary  -     Therapy Frequency (OT) 5 times/wk  -     Row Name 01/08/22 1204          Therapy Plan Review/Discharge Plan (OT)    Anticipated Discharge Disposition (OT) inpatient rehabilitation facility  -     Row Name 01/08/22 1204          Positioning and Restraints    Pre-Treatment Position in bed  -AC     Post Treatment Position bed  -AC     In Bed supine; encouraged to call for assist; call light within reach  -           User Key  (r) = Recorded By, (t) = Taken By, (c) = Cosigned By    Initials Name Provider Type    AC Margaret Caal, OT Occupational Therapist               Outcome Measures     Row Name 01/08/22 1206          How much help from another is currently needed...    Putting on and taking off regular lower body clothing? 1  -AC     Bathing (including washing, rinsing, and drying) 1  -AC     Toileting (which includes using toilet bed pan or urinal) 1  -AC     Putting on and taking off regular upper body clothing 4  -AC     Taking care of personal grooming (such as brushing teeth) 4  -AC     Eating meals 4  -AC     AM-PAC 6 Clicks Score (OT) 15  -     Row Name 01/08/22 0930          How much help from another person do you currently need...    Turning from your back to your side while in flat bed without using bedrails? 3  -AS     Moving from lying on back to sitting on the side of a flat bed without bedrails? 2  -AS     Moving to and from a bed to a chair (including a wheelchair)? 2  -AS     Standing up from a chair using your arms (e.g., wheelchair, bedside chair)? 2  -AS     Climbing 3-5  steps with a railing? 1  -AS     To walk in hospital room? 1  -AS     AM-PAC 6 Clicks Score (PT) 11  -AS     Row Name 01/08/22 1206          Functional Assessment    Outcome Measure Options AM-PAC 6 Clicks Daily Activity (OT); Optimal Instrument  -AC     Row Name 01/08/22 1206          Optimal Instrument    Optimal Instrument Optimal - 3  -AC     Bending/Stooping 4  -AC     Standing 3  -AC     Reaching 2  -AC     From the list, choose the 3 activities you would most like to be able to do without any difficulty Bending/stooping; Reaching; Standing  -AC     Total Score Optimal - 3 9  -AC           User Key  (r) = Recorded By, (t) = Taken By, (c) = Cosigned By    Initials Name Provider Type    AS Lorie Dallas, RN Registered Nurse    Margaret Fontanez OT Occupational Therapist                Occupational Therapy Education                 Title: PT OT SLP Therapies (Done)     Topic: Occupational Therapy (Done)     Point: ADL training (Done)     Description:   Instruct learner(s) on proper safety adaptation and remediation techniques during self care or transfers.   Instruct in proper use of assistive devices.              Learning Progress Summary           Patient Acceptance, E, VU by  at 1/8/2022 1211                   Point: Home exercise program (Done)     Description:   Instruct learner(s) on appropriate technique for monitoring, assisting and/or progressing therapeutic exercises/activities.              Learning Progress Summary           Patient Acceptance, E, VU by  at 1/8/2022 1211                   Point: Precautions (Done)     Description:   Instruct learner(s) on prescribed precautions during self-care and functional transfers.              Learning Progress Summary           Patient Acceptance, E, VU by  at 1/8/2022 1211                   Point: Body mechanics (Done)     Description:   Instruct learner(s) on proper positioning and spine alignment during self-care, functional mobility activities  and/or exercises.              Learning Progress Summary           Patient Acceptance, E, VU by  at 1/8/2022 1211                               User Key     Initials Effective Dates Name Provider Type Discipline     06/16/21 -  Margaret Caal OT Occupational Therapist OT              OT Recommendation and Plan  Planned Therapy Interventions (OT): activity tolerance training, functional balance retraining, occupation/activity based interventions, ROM/therapeutic exercise, transfer/mobility retraining, patient/caregiver education/training, BADL retraining  Therapy Frequency (OT): 5 times/wk  Plan of Care Review  Plan of Care Reviewed With: patient  Progress: no change  Outcome Summary: Patient presents with balance, endurance, strength, pain limitations that impede his/her ability to perform ADLS. The skills of a therapist are necessary to maximize independence with ADLs.     Time Calculation:    Time Calculation- OT     Row Name 01/08/22 1212             Time Calculation- OT    OT Received On 01/08/22  -      OT Goal Re-Cert Due Date 01/17/22  -              Untimed Charges    OT Eval/Re-eval Minutes 32  -AC              Total Minutes    Untimed Charges Total Minutes 32  -AC       Total Minutes 32  -AC            User Key  (r) = Recorded By, (t) = Taken By, (c) = Cosigned By    Initials Name Provider Type     Margaret Caal OT Occupational Therapist              Therapy Charges for Today     Code Description Service Date Service Provider Modifiers Qty    74285954805 HC OT EVAL LOW COMPLEXITY 3 1/8/2022 Margaret aCal OT GO 1               Margaret Caal OT  1/8/2022

## 2022-01-08 NOTE — PLAN OF CARE
Goal Outcome Evaluation:  Plan of Care Reviewed With: patient        Progress: no change  Outcome Summary: Patient presents with balance, endurance, strength, pain limitations that impede his/her ability to perform ADLS. The skills of a therapist are necessary to maximize independence with ADLs.

## 2022-01-08 NOTE — PROGRESS NOTES
[facility]   Hospitalist Progress Note  Date: 2022  Patient Name: Sarina Trevino  : 1932  MRN: [id number]  Date of admission: 2022      Subjective   Subjective     Chief Complaint: follow up for left hip pain    Summary: 89 y/o F with CAD, HTN who presented after sustaining left superior and inferior pubic rami fractures.  The patient stood from a sitting position and had vasovagal syncope resulting in these fractures.  Conservative management, no operative intervention.  Patient is pending rehab placement. Has some chronic issues with swallowing pills and is willing to work with speech therapy.  Area of concern on CT chest, patient does not want further work-up or biopsies.    Interval Followup: SHe is having her chronic bloating today which is very bothersome to her.  Constipated and has not had a bowel in a few days.  Also is concerned about her blood pressure and states that she does have her home supply of telmisartan which she is happy to take.    Review of Systems   No nausea no vomiting no diarrhea, no chest pain    Objective   Objective     Vitals:   Temp:  [97.2 °F (36.2 °C)-98.9 °F (37.2 °C)] 97.2 °F (36.2 °C)  Heart Rate:  [76-91] 77  Resp:  [16-22] 16  BP: (160-182)/(69-84) 160/70  Physical Exam   Gen: NAD, WDWN elderly female lying in bed in moderate discomfort  HEENT: Mucous membranes moist, laceration mid forehead healing well  Resp: CTAB no wrr, no dyspnea  CV: RRR no mrg, no LE edema  GI: Abdomen soft mildly tender to palpation, moderately distended, normoactive bowel sounds  Psych: AOx3, normal mood and affect  : Pure wick in place     Result Review    Result Review:  I have personally reviewed the results from the time of this admission to 2022 13:47 EST and agree with these findings:  [x]  Laboratory  []  Microbiology  [x]  Radiology  []  EKG/Telemetry   []  Cardiology/Vascular   []  Pathology  []  Old records  []  Other:    Assessment/Plan   Assessment / Plan      Assessment:  · Pelvic fracture  · Osteopenia  · Syncope, vasovagal  · Essential hypertension  · 2.3 cm nodule right lung  · Lytic lesion of the proximal right humerus  · Dysphagia        Plan:  · PT OT, planning for rehab, awaiting pre-CERT at [facility]  · Scheduled Tylenol, oral oxycodone for pain, discontinue morphine  · No further work-up in regards to chest CT per patient request  · Likely has osteoporosis, may benefit from bisphosphonate as an outpatient, continue calcium and vitamin D supplementation  · Continue aspirin and HCTZ, continue metoprolol, changing losartan to her home medication which is nonformulary here, patient has home supply with her  · Speech therapy for dysphagia   · Fleets enema x1    DVT prophylaxis: Lovenox  Diet: Cardiac  Plan discussed with bedside RN

## 2022-01-09 VITALS
RESPIRATION RATE: 16 BRPM | SYSTOLIC BLOOD PRESSURE: 141 MMHG | HEART RATE: 92 BPM | HEIGHT: 63 IN | TEMPERATURE: 98.8 F | OXYGEN SATURATION: 95 % | BODY MASS INDEX: 23.16 KG/M2 | DIASTOLIC BLOOD PRESSURE: 70 MMHG | WEIGHT: 130.73 LBS

## 2022-01-09 PROCEDURE — 97530 THERAPEUTIC ACTIVITIES: CPT | Performed by: PHYSICAL THERAPIST

## 2022-01-09 PROCEDURE — 25010000002 ENOXAPARIN PER 10 MG: Performed by: INTERNAL MEDICINE

## 2022-01-09 PROCEDURE — 99239 HOSP IP/OBS DSCHRG MGMT >30: CPT | Performed by: INTERNAL MEDICINE

## 2022-01-09 RX ORDER — MELATONIN
1000 DAILY
Start: 2022-01-10

## 2022-01-09 RX ORDER — OXYCODONE HYDROCHLORIDE 10 MG/1
10 TABLET ORAL EVERY 4 HOURS PRN
Qty: 18 TABLET | Refills: 0 | Status: SHIPPED | OUTPATIENT
Start: 2022-01-09 | End: 2022-01-12

## 2022-01-09 RX ORDER — TELMISARTAN 80 MG/1
80 TABLET ORAL 2 TIMES DAILY
Qty: 180 TABLET | Refills: 2
Start: 2022-01-09

## 2022-01-09 RX ORDER — TELMISARTAN 80 MG/1
80 TABLET ORAL 2 TIMES DAILY
Status: DISCONTINUED | OUTPATIENT
Start: 2022-01-09 | End: 2022-01-09 | Stop reason: HOSPADM

## 2022-01-09 RX ORDER — LIDOCAINE 50 MG/G
1 PATCH TOPICAL
Start: 2022-01-10

## 2022-01-09 RX ORDER — TELMISARTAN 80 MG/1
80 TABLET ORAL 2 TIMES DAILY
Status: DISCONTINUED | OUTPATIENT
Start: 2022-01-09 | End: 2022-01-09

## 2022-01-09 RX ORDER — ACETAMINOPHEN 500 MG
1000 TABLET ORAL 3 TIMES DAILY
Qty: 14 TABLET | Refills: 0 | Status: SHIPPED | OUTPATIENT
Start: 2022-01-09 | End: 2022-01-12

## 2022-01-09 RX ORDER — POLYETHYLENE GLYCOL 3350 17 G/17G
17 POWDER, FOR SOLUTION ORAL DAILY PRN
Start: 2022-01-09

## 2022-01-09 RX ORDER — OXYCODONE HYDROCHLORIDE 5 MG/1
5 TABLET ORAL EVERY 4 HOURS PRN
Qty: 18 TABLET | Refills: 0 | Status: SHIPPED | OUTPATIENT
Start: 2022-01-09 | End: 2022-01-12

## 2022-01-09 RX ADMIN — Medication 1 TABLET: at 10:19

## 2022-01-09 RX ADMIN — Medication 1000 UNITS: at 10:19

## 2022-01-09 RX ADMIN — TELMISARTAN 80 MG: 80 TABLET ORAL at 14:34

## 2022-01-09 RX ADMIN — FAMOTIDINE 40 MG: 20 TABLET ORAL at 10:20

## 2022-01-09 RX ADMIN — SODIUM PHOSPHATE 1 ENEMA: 7; 19 ENEMA RECTAL at 11:53

## 2022-01-09 RX ADMIN — HYDROCHLOROTHIAZIDE 25 MG: 25 TABLET ORAL at 10:20

## 2022-01-09 RX ADMIN — ACETAMINOPHEN 1000 MG: 500 TABLET, FILM COATED ORAL at 10:20

## 2022-01-09 RX ADMIN — METOPROLOL SUCCINATE 25 MG: 25 TABLET, FILM COATED, EXTENDED RELEASE ORAL at 10:20

## 2022-01-09 RX ADMIN — ASPIRIN 81 MG: 81 TABLET, COATED ORAL at 10:20

## 2022-01-09 RX ADMIN — ENOXAPARIN SODIUM 40 MG: 40 INJECTION SUBCUTANEOUS at 10:20

## 2022-01-09 RX ADMIN — OXYCODONE HYDROCHLORIDE 5 MG: 5 TABLET ORAL at 03:58

## 2022-01-09 RX ADMIN — SENNOSIDES AND DOCUSATE SODIUM 2 TABLET: 50; 8.6 TABLET ORAL at 10:20

## 2022-01-09 RX ADMIN — OXYCODONE HYDROCHLORIDE 5 MG: 5 TABLET ORAL at 13:46

## 2022-01-09 RX ADMIN — SODIUM CHLORIDE, PRESERVATIVE FREE 10 ML: 5 INJECTION INTRAVENOUS at 10:21

## 2022-01-09 RX ADMIN — LIDOCAINE 1 PATCH: 50 PATCH CUTANEOUS at 10:26

## 2022-01-09 NOTE — THERAPY TREATMENT NOTE
Acute Care - Physical Therapy Treatment Note   [place]     Patient Name: Sarina Trevino  : 1932  MRN: [id number]  Today's Date: 2022      Visit Dx:     ICD-10-CM ICD-9-CM   1. Left hip pain  M25.552 719.45   2. Closed nondisplaced fracture of pelvis, unspecified part of pelvis, sequela  S32.9XXS 905.1   3. Difficulty in walking  R26.2 719.7   4. Oropharyngeal dysphagia  R13.12 787.22   5. Decreased activities of daily living (ADL)  Z78.9 V49.89   6. Closed fracture of left pubis, unspecified portion of pubis, initial encounter ([facility])  S32.502A 808.2     Patient Active Problem List   Diagnosis   • CAD   • Essential hypertension   • Hyperlipidemia LDL goal <70   • Allergic rhinitis   • Arthritis   • Pelvic fracture ([facility])   • Osteopenia   • Degenerative arthritis of lumbar spine   • Hypokalemia   • Syncope   • Left hip pain     Past Medical History:   Diagnosis Date   • Anxiety    • CAD 10/20/2021   • Cancer ([facility])    • Essential hypertension 10/20/2021   • Hyperlipidemia LDL goal <70 10/20/2021   • Pneumonia      History reviewed. No pertinent surgical history.  PT Assessment (last 12 hours)     PT Evaluation and Treatment     Row Name 22 1300          Physical Therapy Time and Intention    Subjective Information complains of; weakness; fatigue  -RO     Row Name 22 1300          Bed Mobility    Scooting/Bridging [place] (Bed Mobility) moderate assist (50% patient effort)  -RO     Supine-Sit [place] (Bed Mobility) moderate assist (50% patient effort)  -RO     Sit-Supine [place] (Bed Mobility) moderate assist (50% patient effort)  -RO     Row Name 22 1300          Transfers    Transfers sit-stand transfer; stand-sit transfer  -RO     Sit-Stand [place] (Transfers) minimum assist (75% patient effort)  -RO     Stand-Sit [place] (Transfers) minimum assist (75% patient effort)  -RO     Row Name 22 1300          Sit-Stand Transfer    Assistive Device (Sit-Stand  Transfers) walker, front-wheeled  -RO     Row Name 01/09/22 1300          Stand-Sit Transfer    Assistive Device (Stand-Sit Transfers) walker, front-wheeled  -RO     Row Name 01/09/22 1300          Progress Summary (PT)    Progress Toward Functional Goals (PT) progress toward functional goals is good  -RO           User Key  (r) = Recorded By, (t) = Taken By, (c) = Cosigned By    Initials Name Provider Type    RO Inna Umanzor, PT Physical Therapist                Physical Therapy Education                 Title: PT OT SLP Therapies (Done)     Topic: Physical Therapy (Done)     Point: Mobility training (Done)     Learning Progress Summary           Patient Acceptance, E, VU by  at 1/8/2022 1211    Acceptance, E, VU by  at 1/5/2022 2218    Acceptance, E,TB, VU by ANDRE at 1/3/2022 1107                   Point: Precautions (Done)     Learning Progress Summary           Patient Acceptance, E, VU by  at 1/8/2022 1211    Acceptance, E, VU by  at 1/5/2022 2218    Acceptance, E,TB, VU by ANDRE at 1/3/2022 1107                               User Key     Initials Effective Dates Name Provider Type Discipline     06/16/21 -  Teresa Castelan, RN Registered Nurse Nurse     06/16/21 -  Margaret Caal OT Occupational Therapist OT    ANDRE 06/03/21 -  Sonny Magaña, PT Physical Therapist PT              PT Recommendation and Plan     Progress Summary (PT)  Progress Toward Functional Goals (PT): progress toward functional goals is good   Outcome Measures     Row Name 01/09/22 1300 01/07/22 1600          How much help from another person do you currently need...    Turning from your back to your side while in flat bed without using bedrails? 3  -RO 3  -CS     Moving from lying on back to sitting on the side of a flat bed without bedrails? 2  -RO 2  -CS     Moving to and from a bed to a chair (including a wheelchair)? 2  -RO 2  -CS     Standing up from a chair using your arms (e.g., wheelchair, bedside chair)? 2  -RO 2   -CS     Climbing 3-5 steps with a railing? 1  -RO 1  -CS     To walk in hospital room? 1  -RO 1  -CS     AM-PAC 6 Clicks Score (PT) 11  -RO 11  -CS            Functional Assessment    Outcome Measure Options -- AM-PAC 6 Clicks Basic Mobility (PT)  -CS           User Key  (r) = Recorded By, (t) = Taken By, (c) = Cosigned By    Initials Name Provider Type    RO Inna Umanzor, KEMI Physical Therapist    Gage Nazario PTA Physical Therapy Assistant                 Time Calculation:    PT Charges     Row Name 01/09/22 1310             Time Calculation    PT Received On 01/09/22  -RO      PT Goal Re-Cert Due Date 01/12/22  -RO              Timed Charges    68286 - PT Therapeutic Activity Minutes 12  -RO              Total Minutes    Timed Charges Total Minutes 12  -RO       Total Minutes 12  -RO            User Key  (r) = Recorded By, (t) = Taken By, (c) = Cosigned By    Initials Name Provider Type    RO Inna Umanzor, PT Physical Therapist              Therapy Charges for Today     Code Description Service Date Service Provider Modifiers Qty    75089745553 HC PT THERAPEUTIC ACT EA 15 MIN 1/9/2022 Inna Umanzor, PT GP 1          PT G-Codes  Outcome Measure Options: AM-PAC 6 Clicks Daily Activity (OT), Optimal Instrument  AM-PAC 6 Clicks Score (PT): 11  AM-PAC 6 Clicks Score (OT): 15    Inna Umanzor PT  1/9/2022

## 2022-01-09 NOTE — PLAN OF CARE
Goal Outcome Evaluation:  Plan of Care Reviewed With: patient        Progress: no change   Patient is being discharged to LTC

## 2022-01-09 NOTE — SIGNIFICANT NOTE
01/09/22 1142   Plan   Final Discharge Disposition Code 03 - skilled nursing facility (SNF)   Final Note Kami with [employer] contacted  and states that precert has been approved and bed is available today at [facility]. MD is going to place discharge orders.

## 2022-01-19 ENCOUNTER — OFFICE VISIT (OUTPATIENT)
Dept: ORTHOPEDIC SURGERY | Facility: CLINIC | Age: 87
End: 2022-01-19

## 2022-01-19 VITALS — WEIGHT: 130 LBS | BODY MASS INDEX: 23.04 KG/M2 | HEIGHT: 63 IN

## 2022-01-19 DIAGNOSIS — S32.9XXD CLOSED NONDISPLACED FRACTURE OF PELVIS WITH ROUTINE HEALING, UNSPECIFIED PART OF PELVIS, SUBSEQUENT ENCOUNTER: Primary | ICD-10-CM

## 2022-01-19 PROCEDURE — 99213 OFFICE O/P EST LOW 20 MIN: CPT | Performed by: ORTHOPAEDIC SURGERY

## 2022-01-19 NOTE — PROGRESS NOTES
"Chief Complaint  Follow-up of the Pelvis     Subjective      Sarina Trevino presents to [facility] for a follow-up of pelvis. Patient sustained a pelvis fracture, left superior and inferior ramus when going to the bathroom from her bedroom and falling. She was brought to the ED by EMS. Patient present today in a wheelchair for ambulation assistance. Patient states pain has improved since her fall. Her pain comes at different levels. She states she can stand up well. She has good days and bad days. She states she is staying at [facility].     Allergies   Allergen Reactions   • Meloxicam Dizziness   • Codeine Nausea And Vomiting        Social History     Socioeconomic History   • Marital status:    Tobacco Use   • Smoking status: Never Smoker   • Smokeless tobacco: Never Used   Vaping Use   • Vaping Use: Never used   Substance and Sexual Activity   • Alcohol use: Never   • Drug use: Never   • Sexual activity: Not Currently        Review of Systems     Objective   Vital Signs:   Ht 160 cm (63\")   Wt 59 kg (130 lb)   BMI 23.03 kg/m²       Physical Exam  Constitutional:       Appearance: Normal appearance. Patient is well-developed and normal weight.   HENT:      Head: Normocephalic.      Right Ear: Hearing and external ear normal.      Left Ear: Hearing and external ear normal.      Nose: Nose normal.   Eyes:      Conjunctiva/sclera: Conjunctivae normal.   Cardiovascular:      Rate and Rhythm: Normal rate.   Pulmonary:      Effort: Pulmonary effort is normal.      Breath sounds: No wheezing or rales.   Abdominal:      Palpations: Abdomen is soft.      Tenderness: There is no abdominal tenderness.   Musculoskeletal:      Cervical back: Normal range of motion.   Skin:     Findings: No rash.   Neurological:      Mental Status: Patient  is alert and oriented to person, place, and time.   Psychiatric:         Mood and Affect: Mood and affect normal.         " Judgment: Judgment normal.       Ortho Exam      LOWER EXTREMITY: Ambulation with a wheelchair. Calf supple, non-tender, no signs of DVT. Dorsal Pedal Pulse 2+, posterior tibialis pulse 2+. Good tone of hip flexors, hip extensors, hip adductor, hip abductors. Sensation grossly intact. Neurovascular intact. Mild to moderate groin pain.       Procedures        Imaging Results (Most Recent)     Procedure Component Value Units Date/Time    XR Pelvis 1 or 2 View [[id number]] Resulted: 01/19/22 1110     Updated: 01/19/22 1116           Result Review :     X-Ray Report:  Pelvis  X-Ray  Indication: Evaluation of pelvis injury   AP and Lateral view(s)  Findings: Minimally displaced fractures of the left superior and inferior pubic rami with appropriate anatomic alignment.   Prior studies available for comparison: yes            XR Hip With or Without Pelvis 2 - 3 View Left    Result Date: 1/2/2022  Narrative: PROCEDURE: XR HIP W OR WO PELVIS 2-3 VIEW LEFT  COMPARISON: None  INDICATIONS: FALL  FINDINGS:  There are acute mildly displaced fractures of the left superior and inferior pubic rami.  Fracture lines do not appear to involve the acetabulum.  Sacroiliac joints appear intact.  There is mild narrowing of the hip joint spaces bilaterally.  No other acute fractures are seen.  There are degenerative changes of the lower lumbar spine.  CONCLUSION:  1. Acute fractures of the left superior and inferior pubic rami 2. Minimal degenerative change of the hip joints bilaterally.      ALONZO CLAUDIO MD       Electronically Signed and Approved By: ALONZO CLAUDIO MD on 1/02/2022 at 12:41                      Assessment and Plan     DX: Pelvic fracture     Discussed treatment plans with the patient. Her fracture is healing well, we will obtain repeat films next visit. Therapy note provided. She will continue trying to walk.     Call or return if worsening symptoms.    Follow Up     4-6 weeks.       Patient was given instructions and  counseling regarding her condition or for health maintenance advice. Please see specific information pulled into the AVS if appropriate.     Scribed for Kalpesh Mac MD by Candie Bennett.  01/19/22   11:15 EST      I have personally performed the services described in this document as scribed by the above individual and it is both accurate and complete. Kalpesh Mac MD 01/19/22

## 2022-03-02 ENCOUNTER — OFFICE VISIT (OUTPATIENT)
Dept: ORTHOPEDIC SURGERY | Facility: CLINIC | Age: 87
End: 2022-03-02

## 2022-03-02 VITALS — BODY MASS INDEX: 21.62 KG/M2 | HEIGHT: 63 IN | WEIGHT: 122 LBS | OXYGEN SATURATION: 96 % | HEART RATE: 78 BPM

## 2022-03-02 DIAGNOSIS — S32.9XXD CLOSED NONDISPLACED FRACTURE OF PELVIS WITH ROUTINE HEALING, UNSPECIFIED PART OF PELVIS, SUBSEQUENT ENCOUNTER: Primary | ICD-10-CM

## 2022-03-02 PROCEDURE — 99213 OFFICE O/P EST LOW 20 MIN: CPT | Performed by: ORTHOPAEDIC SURGERY

## 2022-03-02 RX ORDER — BUSPIRONE HYDROCHLORIDE 5 MG/1
TABLET ORAL
COMMUNITY
Start: 2022-02-21

## 2022-03-02 RX ORDER — OXYCODONE HYDROCHLORIDE 5 MG/1
TABLET ORAL
COMMUNITY
Start: 2022-01-31

## 2022-03-02 RX ORDER — HYDROCODONE BITARTRATE AND ACETAMINOPHEN 7.5; 325 MG/1; MG/1
TABLET ORAL
COMMUNITY
Start: 2022-02-23

## 2022-03-02 NOTE — PROGRESS NOTES
"Chief Complaint  Follow-up of the Pelvis     Subjective      Sarina Trevino presents to [facility] for a follow-up of pelvis. She is present today with a wheelchair. Patient sustained a pelvis fracture, left superior and inferior ramus when going to the bathroom from her bedroom and falling. She has been getting up and walking, she states it gets easier. She also transfers easier.     Allergies   Allergen Reactions   • Meloxicam Dizziness   • Codeine Nausea And Vomiting        Social History     Socioeconomic History   • Marital status:    Tobacco Use   • Smoking status: Never Smoker   • Smokeless tobacco: Never Used   Vaping Use   • Vaping Use: Never used   Substance and Sexual Activity   • Alcohol use: Never   • Drug use: Never   • Sexual activity: Not Currently        Review of Systems     Objective   Vital Signs:   Pulse 78   Ht 160 cm (63\")   Wt 55.3 kg (122 lb)   SpO2 96%   BMI 21.61 kg/m²       Physical Exam  Constitutional:       Appearance: Normal appearance. Patient is well-developed and normal weight.   HENT:      Head: Normocephalic.      Right Ear: Hearing and external ear normal.      Left Ear: Hearing and external ear normal.      Nose: Nose normal.   Eyes:      Conjunctiva/sclera: Conjunctivae normal.   Cardiovascular:      Rate and Rhythm: Normal rate.   Pulmonary:      Effort: Pulmonary effort is normal.      Breath sounds: No wheezing or rales.   Abdominal:      Palpations: Abdomen is soft.      Tenderness: There is no abdominal tenderness.   Musculoskeletal:      Cervical back: Normal range of motion.   Skin:     Findings: No rash.   Neurological:      Mental Status: Patient  is alert and oriented to person, place, and time.   Psychiatric:         Mood and Affect: Mood and affect normal.         Judgment: Judgment normal.       Ortho Exam      PELVIS: Calf supple, non-tender, no signs of DVT. Dorsal Pedal Pulse 2+, posterior tibialis pulse 2+. Sensation " grossly intact. Neurovascular intact.  Good tone of hip flexors, hip extensors, hip adductor, hip abductors. Non-tender.       Procedures        Imaging Results (Most Recent)     Procedure Component Value Units Date/Time    XR Pelvis 1 or 2 View [[id number]] Resulted: 03/02/22 1104     Updated: 03/02/22 1104           Result Review :     X-Ray Report:  Pelvis  X-Ray  Indication: Evaluation of pelvis   AP and Lateral view(s)  Findings: Well healing pelvic fracture.   Prior studies available for comparison: yes     Assessment and Plan     DX: Pelvis fracture     Discussed treatment plans with the patient. She is doing well, she has begun weight bearing. Continue and progress back into activities as tolerated.     Call or return if worsening symptoms.    Follow Up     PRN.       Patient was given instructions and counseling regarding her condition or for health maintenance advice. Please see specific information pulled into the AVS if appropriate.     Scribed for Kalpesh Mac MD by Candie Bennett.  03/02/22   11:07 EST      I have personally performed the services described in this document as scribed by the above individual and it is both accurate and complete. Kalpesh Mac MD 03/02/22

## 2024-01-22 ENCOUNTER — APPOINTMENT (OUTPATIENT)
Dept: CT IMAGING | Facility: HOSPITAL | Age: 89
End: 2024-01-22
Payer: COMMERCIAL

## 2024-01-22 ENCOUNTER — APPOINTMENT (OUTPATIENT)
Dept: GENERAL RADIOLOGY | Facility: HOSPITAL | Age: 89
End: 2024-01-22
Payer: COMMERCIAL

## 2024-01-22 ENCOUNTER — HOSPITAL ENCOUNTER (EMERGENCY)
Facility: HOSPITAL | Age: 89
Discharge: HOME | End: 2024-01-22
Attending: EMERGENCY MEDICINE | Admitting: EMERGENCY MEDICINE
Payer: COMMERCIAL

## 2024-01-22 VITALS
DIASTOLIC BLOOD PRESSURE: 58 MMHG | TEMPERATURE: 98.3 F | RESPIRATION RATE: 16 BRPM | HEART RATE: 67 BPM | SYSTOLIC BLOOD PRESSURE: 141 MMHG | OXYGEN SATURATION: 96 %

## 2024-01-22 DIAGNOSIS — M19.011 ARTHRITIS OF RIGHT ACROMIOCLAVICULAR JOINT: ICD-10-CM

## 2024-01-22 DIAGNOSIS — R91.8 GROUND GLASS OPACITY PRESENT ON IMAGING OF LUNG: ICD-10-CM

## 2024-01-22 DIAGNOSIS — N28.1 RENAL CYST: ICD-10-CM
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
## 2024-01-22 DIAGNOSIS — M75.101 TEAR OF RIGHT ROTATOR CUFF, UNSPECIFIED TEAR EXTENT, UNSPECIFIED WHETHER TRAUMATIC: ICD-10-CM

## 2024-01-22 LAB
ALBUMIN SERPL-MCNC: 3.7 G/DL (ref 3.5–5.2)
ALBUMIN/GLOB SERPL: 1.1 G/DL
ALP SERPL-CCNC: 83 U/L (ref 39–117)
ALT SERPL W P-5'-P-CCNC: 7 U/L (ref 1–33)
ANION GAP SERPL CALCULATED.3IONS-SCNC: 15.7 MMOL/L (ref 5–15)
AST SERPL-CCNC: 19 U/L (ref 1–32)
BACTERIA UR QL AUTO: ABNORMAL /HPF
BASOPHILS # BLD AUTO: 0.05 10*3/MM3 (ref 0–0.2)
BASOPHILS NFR BLD AUTO: 0.4 % (ref 0–1.5)
BILIRUB SERPL-MCNC: 0.8 MG/DL (ref 0–1.2)
BILIRUB UR QL STRIP: NEGATIVE
BUN SERPL-MCNC: 24 MG/DL (ref 8–23)
BUN/CREAT SERPL: 22.4 (ref 7–25)
CALCIUM SPEC-SCNC: 9.3 MG/DL (ref 8.2–9.6)
CHLORIDE SERPL-SCNC: 94 MMOL/L (ref 98–107)
CLARITY UR: ABNORMAL
CO2 SERPL-SCNC: 27.3 MMOL/L (ref 22–29)
COLOR UR: YELLOW
CREAT SERPL-MCNC: 1.07 MG/DL (ref 0.57–1)
D-LACTATE SERPL-SCNC: 1.4 MMOL/L (ref 0.5–2)
DEPRECATED RDW RBC AUTO: 44.6 FL (ref 37–54)
EGFRCR SERPLBLD CKD-EPI 2021: 49.1 ML/MIN/1.73
EOSINOPHIL # BLD AUTO: 0.26 10*3/MM3 (ref 0–0.4)
EOSINOPHIL NFR BLD AUTO: 2 % (ref 0.3–6.2)
ERYTHROCYTE [DISTWIDTH] IN BLOOD BY AUTOMATED COUNT: 13 % (ref 12.3–15.4)
FLUAV SUBTYP SPEC NAA+PROBE: NOT DETECTED
FLUBV RNA ISLT QL NAA+PROBE: NOT DETECTED
GLOBULIN UR ELPH-MCNC: 3.5 GM/DL
GLUCOSE SERPL-MCNC: 102 MG/DL (ref 65–99)
GLUCOSE UR STRIP-MCNC: NEGATIVE MG/DL
HCT VFR BLD AUTO: 37.1 % (ref 34–46.6)
HGB BLD-MCNC: 12 G/DL (ref 12–15.9)
HGB UR QL STRIP.AUTO: ABNORMAL
HOLD SPECIMEN: NORMAL
HOLD SPECIMEN: NORMAL
HYALINE CASTS UR QL AUTO: ABNORMAL /LPF
IMM GRANULOCYTES # BLD AUTO: 0.05 10*3/MM3 (ref 0–0.05)
IMM GRANULOCYTES NFR BLD AUTO: 0.4 % (ref 0–0.5)
KETONES UR QL STRIP: ABNORMAL
LEUKOCYTE ESTERASE UR QL STRIP.AUTO: NEGATIVE
LIPASE SERPL-CCNC: 30 U/L (ref 13–60)
LYMPHOCYTES # BLD AUTO: 2.1 10*3/MM3 (ref 0.7–3.1)
LYMPHOCYTES NFR BLD AUTO: 16.2 % (ref 19.6–45.3)
MAGNESIUM SERPL-MCNC: 2 MG/DL (ref 1.7–2.3)
MCH RBC QN AUTO: 30 PG (ref 26.6–33)
MCHC RBC AUTO-ENTMCNC: 32.3 G/DL (ref 31.5–35.7)
MCV RBC AUTO: 92.8 FL (ref 79–97)
MONOCYTES # BLD AUTO: 1.35 10*3/MM3 (ref 0.1–0.9)
MONOCYTES NFR BLD AUTO: 10.4 % (ref 5–12)
NEUTROPHILS NFR BLD AUTO: 70.6 % (ref 42.7–76)
NEUTROPHILS NFR BLD AUTO: 9.13 10*3/MM3 (ref 1.7–7)
NITRITE UR QL STRIP: NEGATIVE
NRBC BLD AUTO-RTO: 0 /100 WBC (ref 0–0.2)
PH UR STRIP.AUTO: 7.5 [PH] (ref 5–8)
PLATELET # BLD AUTO: 383 10*3/MM3 (ref 140–450)
PMV BLD AUTO: 9.5 FL (ref 6–12)
POTASSIUM SERPL-SCNC: 3.3 MMOL/L (ref 3.5–5.2)
PROT SERPL-MCNC: 7.2 G/DL (ref 6–8.5)
PROT UR QL STRIP: ABNORMAL
RBC # BLD AUTO: 4 10*6/MM3 (ref 3.77–5.28)
RBC # UR STRIP: ABNORMAL /HPF
REF LAB TEST METHOD: ABNORMAL
RSV RNA NPH QL NAA+NON-PROBE: NOT DETECTED
SARS-COV-2 RNA RESP QL NAA+PROBE: NOT DETECTED
SODIUM SERPL-SCNC: 137 MMOL/L (ref 136–145)
SP GR UR STRIP: >1.03 (ref 1–1.03)
SQUAMOUS #/AREA URNS HPF: ABNORMAL /HPF
UROBILINOGEN UR QL STRIP: ABNORMAL
WBC # UR STRIP: ABNORMAL /HPF
WBC NRBC COR # BLD AUTO: 12.94 10*3/MM3 (ref 3.4–10.8)
WHOLE BLOOD HOLD COAG: NORMAL
WHOLE BLOOD HOLD SPECIMEN: NORMAL

## 2024-01-22 PROCEDURE — 25010000002 ONDANSETRON PER 1 MG

## 2024-01-22 PROCEDURE — 85025 COMPLETE CBC W/AUTO DIFF WBC: CPT | Performed by: EMERGENCY MEDICINE

## 2024-01-22 PROCEDURE — 74177 CT ABD & PELVIS W/CONTRAST: CPT

## 2024-01-22 PROCEDURE — 83690 ASSAY OF LIPASE: CPT | Performed by: EMERGENCY MEDICINE

## 2024-01-22 PROCEDURE — 83735 ASSAY OF MAGNESIUM: CPT

## 2024-01-22 PROCEDURE — 25810000003 SODIUM CHLORIDE 0.9 % SOLUTION

## 2024-01-22 PROCEDURE — 70450 CT HEAD/BRAIN W/O DYE: CPT

## 2024-01-22 PROCEDURE — 81001 URINALYSIS AUTO W/SCOPE: CPT | Performed by: EMERGENCY MEDICINE

## 2024-01-22 PROCEDURE — 96375 TX/PRO/DX INJ NEW DRUG ADDON: CPT

## 2024-01-22 PROCEDURE — 83605 ASSAY OF LACTIC ACID: CPT | Performed by: EMERGENCY MEDICINE

## 2024-01-22 PROCEDURE — 72125 CT NECK SPINE W/O DYE: CPT

## 2024-01-22 PROCEDURE — 87637 SARSCOV2&INF A&B&RSV AMP PRB: CPT

## 2024-01-22 PROCEDURE — 25010000002 KETOROLAC TROMETHAMINE PER 15 MG

## 2024-01-22 PROCEDURE — 71250 CT THORAX DX C-: CPT

## 2024-01-22 PROCEDURE — 80053 COMPREHEN METABOLIC PANEL: CPT | Performed by: EMERGENCY MEDICINE

## 2024-01-22 PROCEDURE — 96374 THER/PROPH/DIAG INJ IV PUSH: CPT

## 2024-01-22 PROCEDURE — 99285 EMERGENCY DEPT VISIT HI MDM: CPT

## 2024-01-22 PROCEDURE — 73030 X-RAY EXAM OF SHOULDER: CPT

## 2024-01-22 PROCEDURE — 71045 X-RAY EXAM CHEST 1 VIEW: CPT

## 2024-01-22 PROCEDURE — 25510000001 IOPAMIDOL PER 1 ML: Performed by: EMERGENCY MEDICINE

## 2024-01-22 PROCEDURE — 93005 ELECTROCARDIOGRAM TRACING: CPT | Performed by: EMERGENCY MEDICINE

## 2024-01-22 RX ORDER — PREDNISONE 5 MG/1
2 TABLET ORAL DAILY
COMMUNITY
Start: 2024-01-08

## 2024-01-22 RX ORDER — KETOROLAC TROMETHAMINE 15 MG/ML
15 INJECTION, SOLUTION INTRAMUSCULAR; INTRAVENOUS ONCE
Status: COMPLETED | OUTPATIENT
Start: 2024-01-22 | End: 2024-01-22

## 2024-01-22 RX ORDER — TRAMADOL HYDROCHLORIDE 50 MG/1
50 TABLET ORAL EVERY 6 HOURS PRN
Qty: 15 TABLET | Refills: 0 | Status: SHIPPED | OUTPATIENT
Start: 2024-01-22

## 2024-01-22 RX ORDER — ONDANSETRON 4 MG/1
4 TABLET, ORALLY DISINTEGRATING ORAL EVERY 8 HOURS PRN
Qty: 15 TABLET | Refills: 0 | Status: SHIPPED | OUTPATIENT
Start: 2024-01-22

## 2024-01-22 RX ORDER — TRAMADOL HYDROCHLORIDE 50 MG/1
50 TABLET ORAL ONCE
Status: COMPLETED | OUTPATIENT
Start: 2024-01-22 | End: 2024-01-22

## 2024-01-22 RX ORDER — GABAPENTIN 100 MG/1
100 CAPSULE ORAL NIGHTLY
COMMUNITY
Start: 2023-11-16

## 2024-01-22 RX ORDER — SODIUM CHLORIDE 0.9 % (FLUSH) 0.9 %
10 SYRINGE (ML) INJECTION AS NEEDED
Status: DISCONTINUED | OUTPATIENT
Start: 2024-01-22 | End: 2024-01-23 | Stop reason: HOSPADM

## 2024-01-22 RX ORDER — HYDROXYZINE HYDROCHLORIDE 25 MG/1
25 TABLET, FILM COATED ORAL 2 TIMES DAILY PRN
COMMUNITY
Start: 2024-01-10

## 2024-01-22 RX ORDER — DOXYCYCLINE HYCLATE 100 MG/1
1 CAPSULE ORAL EVERY 12 HOURS SCHEDULED
COMMUNITY
Start: 2024-01-09

## 2024-01-22 RX ORDER — IPRATROPIUM BROMIDE AND ALBUTEROL SULFATE 2.5; .5 MG/3ML; MG/3ML
SOLUTION RESPIRATORY (INHALATION)
COMMUNITY
Start: 2023-12-28

## 2024-01-22 RX ORDER — PANTOPRAZOLE SODIUM 40 MG/1
1 TABLET, DELAYED RELEASE ORAL DAILY
COMMUNITY
Start: 2023-12-16

## 2024-01-22 RX ORDER — ONDANSETRON 2 MG/ML
4 INJECTION INTRAMUSCULAR; INTRAVENOUS ONCE
Status: COMPLETED | OUTPATIENT
Start: 2024-01-22 | End: 2024-01-22

## 2024-01-22 RX ADMIN — ONDANSETRON 4 MG: 2 INJECTION INTRAMUSCULAR; INTRAVENOUS at 15:36

## 2024-01-22 RX ADMIN — TRAMADOL HYDROCHLORIDE 50 MG: 50 TABLET, COATED ORAL at 17:53

## 2024-01-22 RX ADMIN — KETOROLAC TROMETHAMINE 15 MG: 15 INJECTION, SOLUTION INTRAMUSCULAR; INTRAVENOUS at 15:35

## 2024-01-22 RX ADMIN — SODIUM CHLORIDE 500 ML: 9 INJECTION, SOLUTION INTRAVENOUS at 17:14

## 2024-01-22 RX ADMIN — IOPAMIDOL 100 ML: 755 INJECTION, SOLUTION INTRAVENOUS at 16:13

## 2024-01-22 NOTE — ED PROVIDER NOTES
Time: 3:18 PM EST  Date of encounter:  1/22/2024  Independent Historian/Clinical History and Information was obtained by:   Patient    History is limited by: N/A    Chief Complaint   Patient presents with    Back Pain    Abdominal Pain    Nausea         History of Present Illness:  Patient is a 89 y.o. year old female who presents to the emergency department for evaluation of right shoulder pain, elbow pain, back pain and abdominal pain.  Patient states that yesterday she was in the kitchen putting stuff away when all of a sudden she felt lightheaded and weak and fell.  She states that she fell on a hardwood floor directly onto her tailbone, fell back onto her right shoulder.  She is unsure if she hit her head.  Has complaints of neck pain.  She admits to nausea and dry heaving.  Has a history of diverticulosis and has a left lower quadrant abdominal pain.  Denies dysuria or diarrhea.  Patient denies anticoagulants    Patient Care Team  Primary Care Provider: Provider, No Known    Past Medical History:     Allergies   Allergen Reactions    Meloxicam Dizziness    Codeine Nausea And Vomiting     Past Medical History:   Diagnosis Date    Anxiety     CAD 10/20/2021    Cancer     Essential hypertension 10/20/2021    Hyperlipidemia LDL goal <70 10/20/2021    Pneumonia      History reviewed. No pertinent surgical history.  History reviewed. No pertinent family history.    Home Medications:  Prior to Admission medications    Medication Sig Start Date End Date Taking? Authorizing Provider   doxycycline (VIBRAMYCIN) 100 MG capsule Take 1 capsule by mouth Every 12 (Twelve) Hours. 1/9/24  Yes Ruby Borja MD   gabapentin (NEURONTIN) 100 MG capsule Take 1 capsule by mouth Every Night. 11/16/23  Yes Ruby Borja MD   hydrOXYzine (ATARAX) 25 MG tablet Take 1 tablet by mouth 2 (Two) Times a Day As Needed. for anxiety 1/10/24  Yes Ruby Borja MD   ipratropium-albuterol (DUO-NEB) 0.5-2.5 mg/3 ml nebulizer  inhale contents of 1 vial in NEBULIZER EVERY 4 TO 6 HOURS AS NEEDED FOR cough 12/28/23  Yes Ruby Borja MD   pantoprazole (PROTONIX) 40 MG EC tablet Take 1 tablet by mouth Daily. 12/16/23  Yes Ruby Borja MD   predniSONE (DELTASONE) 5 MG tablet Take 2 tablets by mouth Daily. 1/8/24  Yes Ruby Borja MD   aspirin (aspirin) 81 MG EC tablet Take 81 mg by mouth Daily.    Ruby Borja MD   busPIRone (BUSPAR) 5 MG tablet  2/21/22   Ruby Borja MD   cholecalciferol (VITAMIN D3) 25 MCG (1000 UT) tablet Take 1 tablet by mouth Daily. 1/10/22   Miriam Anders MD   hydroCHLOROthiazide (HYDRODIURIL) 25 MG tablet Take 1 tablet by mouth Daily. 10/27/21   Andrés Curiel MD   HYDROcodone-acetaminophen (NORCO) 7.5-325 MG per tablet  2/23/22   Ruby Borja MD   lidocaine (LIDODERM) 5 % Place 1 patch on the skin as directed by provider Daily. Remove & Discard patch within 12 hours or as directed by MD  Indications: Muscle / Joint Pain 1/10/22   Miriam Anders MD   metoprolol succinate XL (TOPROL-XL) 25 MG 24 hr tablet Take 1 tablet by mouth Daily. 10/27/21   Andrés Curiel MD   oxyCODONE (ROXICODONE) 5 MG immediate release tablet  1/31/22   Ruby Borja MD   polyethylene glycol (MIRALAX) 17 g packet Take 17 g by mouth Daily As Needed (Use if senna-docusate is ineffective). 1/9/22   Miriam Anders MD   telmisartan (MICARDIS) 80 MG tablet Take 1 tablet by mouth 2 (Two) Times a Day. Or as directed. 1/9/22   Miriam Anders MD   Calcium Carb-Cholecalciferol (Calcium Carbonate-Vitamin D3) 600-400 MG-UNIT tablet Take 1 tablet by mouth Daily. 1/10/22 1/22/24  Miriam Anders MD        Social History:   Social History     Tobacco Use    Smoking status: Never    Smokeless tobacco: Never   Vaping Use    Vaping Use: Never used   Substance Use Topics    Alcohol use: Never    Drug use: Never         Review of Systems:  Review of Systems   Eyes: Negative.     Respiratory: Negative.     Cardiovascular: Negative.    Gastrointestinal:  Positive for abdominal pain and nausea. Negative for diarrhea.   Endocrine: Negative.    Genitourinary: Negative.    Musculoskeletal:  Positive for arthralgias, back pain and neck pain.   Skin: Negative.    Allergic/Immunologic: Negative.    Neurological:  Positive for dizziness, weakness, light-headedness and headaches. Negative for speech difficulty.   Hematological: Negative.    Psychiatric/Behavioral: Negative.  Negative for confusion.         Physical Exam:  /64   Pulse 68   Temp 98.3 °F (36.8 °C) (Oral)   Resp 16   SpO2 96%         Physical Exam  Vitals and nursing note reviewed.   Constitutional:       Appearance: Normal appearance. She is normal weight.   HENT:      Head: Normocephalic and atraumatic.      Nose: Nose normal.      Mouth/Throat:      Mouth: Mucous membranes are moist.   Eyes:      Extraocular Movements: Extraocular movements intact.      Conjunctiva/sclera: Conjunctivae normal.      Pupils: Pupils are equal, round, and reactive to light.   Cardiovascular:      Rate and Rhythm: Normal rate and regular rhythm.      Heart sounds: Normal heart sounds.   Pulmonary:      Effort: Pulmonary effort is normal.      Breath sounds: Normal breath sounds.   Abdominal:      General: Abdomen is flat.      Palpations: Abdomen is soft.      Tenderness: There is abdominal tenderness. There is no guarding or rebound.   Musculoskeletal:         General: Tenderness present.      Right shoulder: Tenderness present. Decreased range of motion.      Cervical back: Normal range of motion and neck supple. Tenderness present.      Lumbar back: Tenderness present.   Skin:     General: Skin is warm and dry.      Findings: No abrasion, bruising, erythema, laceration or wound.   Neurological:      General: No focal deficit present.      Mental Status: She is alert and oriented to person, place, and time.   Psychiatric:         Mood and  Affect: Mood normal.         Behavior: Behavior normal.                 Procedures:  Procedures      Medical Decision Making:      Comorbidities that affect care:    Coronary Artery Disease, Hypertension    External Notes reviewed:    Previous Clinic Note: Office visit with Ortho 3/2/2022 and Previous Radiological Studies: CT chest from 1/4/2022 showing  Multifocal clustered nodularity and micro nodularity, with some of the areas having   associated bronchiectasis.  Findings are favored to represent chronic infectious or inflammatory       The following orders were placed and all results were independently analyzed by me:  Orders Placed This Encounter   Procedures    [facility] 02.  Shoulder Immobilizer/Sling    COVID-19, FLU A/B, RSV PCR 1 HR TAT - Swab, Nasopharynx    XR Chest 1 View    XR Shoulder 2+ View Right    CT Cervical Spine Without Contrast    CT Abdomen Pelvis With Contrast    CT Head Without Contrast    CT Chest Without Contrast Diagnostic    [facility] Draw    Comprehensive Metabolic Panel    Lipase    Urinalysis With Microscopic If Indicated (No Culture) - Urine, Clean Catch    Lactic Acid, Plasma    CBC Auto Differential    Magnesium    Urinalysis, Microscopic Only - Urine, Clean Catch    Ambulatory Referral to Urology    Ambulatory Referral to Orthopedic Surgery    Ambulatory Referral to Pulmonology    NPO Diet NPO Type: Strict NPO    Undress & Gown    Straight cath    ECG 12 Lead Rhythm Change    Insert Peripheral IV    CBC & Differential    Green Top (Gel)    Lavender Top    Gold Top - SST    Light Blue Top       Medications Given in the Emergency Department:  Medications   sodium chloride 0.9 % flush 10 mL (has no administration in time range)   ondansetron (ZOFRAN) injection 4 mg (4 mg Intravenous Given 1/22/24 1536)   ketorolac (TORADOL) injection 15 mg (15 mg Intravenous Given 1/22/24 1535)   sodium chloride 0.9 % bolus 500 mL (500 mL Intravenous New Bag 1/22/24 1714)   iopamidol  (ISOVUE-370) 76 % injection 100 mL (100 mL Intravenous Given 1/22/24 1613)   traMADol (ULTRAM) tablet 50 mg (50 mg Oral Given 1/22/24 1753)        ED Course:    The patient was initially evaluated in the triage area where orders were placed. The patient was later dispositioned by Orquidea Umana PA-C.      The patient was advised to stay for completion of workup which includes but is not limited to communication of labs and radiological results, reassessment and plan. The patient was advised that leaving prior to disposition by a provider could result in critical findings that are not communicated to the patient.          Labs:    Lab Results (last 24 hours)       Procedure Component Value Units Date/Time    CBC & Differential [[id number]]  (Abnormal) Collected: 01/22/24 1500    Specimen: Blood Updated: 01/22/24 1511    Narrative:      The following orders were created for panel order CBC & Differential.  Procedure                               Abnormality         Status                     ---------                               -----------         ------                     CBC Auto Differential[[id number]]        Abnormal            Final result                 Please view results for these tests on the individual orders.    Comprehensive Metabolic Panel [[id number]]  (Abnormal) Collected: 01/22/24 1500    Specimen: Blood Updated: 01/22/24 1536     Glucose 102 mg/dL      BUN 24 mg/dL      Creatinine 1.07 mg/dL      Sodium 137 mmol/L      Potassium 3.3 mmol/L      Chloride 94 mmol/L      CO2 27.3 mmol/L      Calcium 9.3 mg/dL      Total Protein 7.2 g/dL      Albumin 3.7 g/dL      ALT (SGPT) 7 U/L      AST (SGOT) 19 U/L      Alkaline Phosphatase 83 U/L      Total Bilirubin 0.8 mg/dL      Globulin 3.5 gm/dL      A/G Ratio 1.1 g/dL      BUN/Creatinine Ratio 22.4     Anion Gap 15.7 mmol/L      eGFR 49.1 mL/min/1.73     Narrative:      GFR Normal >60  Chronic Kidney Disease <60  Kidney Failure <15    The GFR formula  is only valid for adults with stable renal function between ages 18 and 70.    Lipase [[id number]]  (Normal) Collected: 01/22/24 1500    Specimen: Blood Updated: 01/22/24 1536     Lipase 30 U/L     Lactic Acid, Plasma [[id number]]  (Normal) Collected: 01/22/24 1500    Specimen: Blood Updated: 01/22/24 1533     Lactate 1.4 mmol/L     CBC Auto Differential [[id number]]  (Abnormal) Collected: 01/22/24 1500    Specimen: Blood Updated: 01/22/24 1511     WBC 12.94 10*3/mm3      RBC 4.00 10*6/mm3      Hemoglobin 12.0 g/dL      Hematocrit 37.1 %      MCV 92.8 fL      MCH 30.0 pg      MCHC 32.3 g/dL      RDW 13.0 %      RDW-SD 44.6 fl      MPV 9.5 fL      Platelets 383 10*3/mm3      Neutrophil % 70.6 %      Lymphocyte % 16.2 %      Monocyte % 10.4 %      Eosinophil % 2.0 %      Basophil % 0.4 %      Immature Grans % 0.4 %      Neutrophils, Absolute 9.13 10*3/mm3      Lymphocytes, Absolute 2.10 10*3/mm3      Monocytes, Absolute 1.35 10*3/mm3      Eosinophils, Absolute 0.26 10*3/mm3      Basophils, Absolute 0.05 10*3/mm3      Immature Grans, Absolute 0.05 10*3/mm3      nRBC 0.0 /100 WBC     Magnesium [[id number]]  (Normal) Collected: 01/22/24 1500    Specimen: Blood Updated: 01/22/24 1718     Magnesium 2.0 mg/dL     Urinalysis With Microscopic If Indicated (No Culture) - Urine, Clean Catch [[id number]]  (Abnormal) Collected: 01/22/24 1720    Specimen: Urine, Clean Catch Updated: 01/22/24 1732     Color, UA Yellow     Appearance, UA Cloudy     pH, UA 7.5     Specific Gravity, UA >1.030     Glucose, UA Negative     Ketones, UA 40 mg/dL (2+)     Bilirubin, UA Negative     Blood, UA Moderate (2+)     Protein, UA 30 mg/dL (1+)     Leuk Esterase, UA Negative     Nitrite, UA Negative     Urobilinogen, UA 0.2 E.U./dL    COVID-19, FLU A/B, RSV PCR 1 HR TAT - Swab, Nasopharynx [[id number]]  (Normal) Collected: 01/22/24 1720    Specimen: Swab from Nasopharynx Updated: 01/22/24 1816     COVID19 Not Detected     Influenza A PCR Not Detected      Influenza B PCR Not Detected     RSV, PCR Not Detected    Narrative:      Fact sheet for providers: https://www.fda.gov/media/293621/download    Fact sheet for patients: https://www.fda.gov/media/462418/download    Test performed by PCR.    Urinalysis, Microscopic Only - Urine, Clean Catch [[id number]]  (Abnormal) Collected: 01/22/24 1720    Specimen: Urine, Clean Catch Updated: 01/22/24 1732     RBC, UA 21-50 /HPF      WBC, UA 0-2 /HPF      Bacteria, UA None Seen /HPF      Squamous Epithelial Cells, UA 0-2 /HPF      Hyaline Casts, UA 0-2 /LPF      Methodology Automated Microscopy             Imaging:    CT Abdomen Pelvis With Contrast    Result Date: 1/22/2024  PROCEDURE: CT ABDOMEN PELVIS W CONTRAST  COMPARISON: [facility], CT, ABDOMEN/PELVIS WITH CONTRAST, 9/08/2020, 16:27.  INDICATIONS: Nausea/vomiting  TECHNIQUE: After obtaining the patient's consent, CT images were created with non-ionic intravenous contrast material.   PROTOCOL:   Standard imaging protocol performed    RADIATION:   DLP: 425.6 mGy*cm   Automated exposure control was utilized to minimize radiation dose. CONTRAST: 100 cc Isovue 370 I.V.  FINDINGS:  This report was dictated under the CT chest.  Please see CT chest report.  Based on that report by Dr. Braxton, no acute intra-abdominal intrapelvic abnormality was identified.  There is small amount of free fluid in pelvic cul-de-sac was nonspecific and a rather chronic findings.       Please see CT of the chest for findings of the abdomen pelvis performed same date.     JEFF CHÁVEZ MD       Electronically Signed and Approved By: JEFF CHÁVEZ MD on 1/22/2024 at 19:04             CT Chest Without Contrast Diagnostic    Result Date: 1/22/2024  PROCEDURE: CT CHEST WO CONTRAST DIAGNOSTIC  COMPARISON: [facility], CR, XR CHEST 1 VW, 1/22/2024, 15:49.  [facility], CT, CT CHEST WO CONTRAST DIAGNOSTIC, 1/04/2022, 14:41.  [facility], CT, CT ABDOMEN PELVIS W  CONTRAST, 1/22/2024, 16:13.  INDICATIONS: abnl cxr 89-year-old, recent fall  TECHNIQUE: CT images were created without the administration of contrast material.   PROTOCOL:   Standard imaging protocol performed    RADIATION:   DLP: 172.5 mGy*cm   Automated exposure control was utilized to minimize radiation dose.  FINDINGS:  Chest:  There are extensive areas of multifocal tree-in-bud opacities throughout each lung which have increased in number.  Within the right middle lobe there is bronchiectasis and nodular consolidation peripherally in the right middle lobe (axial image 41) measuring 2 cm in maximum dimension similar to prior study.  There is also bronchiectasis with tree-in-bud opacity and nodular consolidations in the lingula slightly more prominent on the current exam.  There are focal nodular ground-glass opacities throughout the upper lobes which are new. The central tracheobronchial tree is widely patent.  No abnormal bronchial wall thickening.  No pathologically enlarged thoracic or axillary lymph nodes are identified.  There is a moderate degree of vascular calcification in the thoracic aorta.  Coronary artery calcifications are also present in there is mild cardiomegaly.  The thoracic aorta is nonaneurysmal.  There is a small pericardial effusion.  No pleural effusions.  There are focal nodular ground-glass opacities throughout the upper lobes which are new.  Abdomen and pelvis:  The liver, spleen, pancreas and adrenal glands are unremarkable.  The gallbladder is present.  There is no biliary dilatation.  There are multiple incidental cysts throughout each kidney the largest is in the lower pole the right kidney measuring 2.7 cm.  The bladder is unremarkable.  The uterus small, compatible with her advanced age is retroflexed/retroverted with popcorn type calcifications indicative of fibroids.  The stomach and small bowel loops are decompressed without focal abnormality identified.  The appendix is  normal.  The colon demonstrates no focal wall thickening or dilation there is a moderate amount of stool throughout the colon .  Small amount of free fluid is present in the dependent portions of the pelvis.  No loculated fluid collections.  No adenopathy or acute inflammatory changes are seen.  Review of the skeletal structures of the chest abdomen pelvis demonstrate no acute bony abnormality or aggressive appearing focal osseous lesions.  There are multilevel degenerative changes in the lower thoracic spine and throughout the lumbar spine.       1. Extensive multifocal tree-in-bud opacities as well as patchy consolidations have increased/progressed since prior study from 1 year ago.  There also new focal ground-glass opacities throughout the upper lobes.  There is extensive bronchiectasis in the right middle lobe and lingula.  The overall appearance of her chest is compatible with chronic and or recurrent infection. 2. Senescent changes in vascular and skeletal structures as described above.   3. No acute intra-abdominal or intrapelvic abnormality.  A very small amount of free fluid in the pelvic cul-de-sac is very nonspecific.  Other chronic incidental ancillary findings are detailed above.    BI KHALIL DO       Electronically Signed and Approved By: BI KHALIL DO on 1/22/2024 at 16:51             CT Cervical Spine Without Contrast    Result Date: 1/22/2024  PROCEDURE: CT CERVICAL SPINE WO CONTRAST  COMPARISON: [facility], CT, CT CERVICAL SPINE WO CONTRAST, 1/02/2022, 13:03.  INDICATIONS: fall  PROTOCOL:   Standard imaging protocol performed    RADIATION:   DLP: 320 mGy*cm   MA and/or KV was adjusted to minimize radiation dose.    TECHNIQUE: Multiplanar CT images were created without contrast material.   FINDINGS:  No abnormality is seen at the craniocervical junction.  Moderate degenerative spurring is seen between the anterior arch of C1 and the dens.  Vertebral body heights are  maintained.  No fractures or subluxations are seen.  The cervical disc spaces remain moderately narrowed, with moderate endplate spurring.  Moderate to marked facet arthropathy is seen throughout the cervical spine, most severe at C5-6 and C6-7.   No high-grade encroachment on the spinal canal or foramina is evident.   No soft tissue mass or adenopathy is seen.        CT scan of the cervical spine demonstrating multi level degenerative change.    SEAN BURROWS MD       Electronically Signed and Approved By: SEAN BURROWS MD on 1/22/2024 at 16:46             CT Head Without Contrast    Result Date: 1/22/2024  PROCEDURE: CT HEAD WO CONTRAST  COMPARISON:  [facility], CT, CT HEAD WO CONTRAST, 1/02/2022, 13:03.  INDICATIONS: fall  PROTOCOL:   Standard imaging protocol performed    RADIATION:   DLP: 952.8 mGy*cm   MA and/or KV was adjusted to minimize radiation dose.    TECHNIQUE: CT images were obtained without non-ionic intravenous contrast material.  FINDINGS:  The ventricles are mildly and diffusely prominent consistent with atrophy.  Ill-defined diminished density in cerebral white matter is consistent with mild gliosis and/or scattered old lacunar infarcts.  There is no CT evidence of acute intracranial hemorrhage, mass, or mass effect.  No abnormality of the orbits is evident.  Partial opacification of the frontal sinuses is unchanged.  The paranasal sinuses are otherwise well aerated.  The middle ears and mastoid air cells are well aerated.  No fractures are seen on bone window images.  CONTINUED ON NEXT PAGE...          CT scan of the head without IV contrast demonstrating mild atrophy and white matter changes.  No acute intracranial abnormality is seen.     SEAN BURROWS MD       Electronically Signed and Approved By: SEAN BURROWS MD on 1/22/2024 at 16:37             XR Shoulder 2+ View Right    Result Date: 1/22/2024  PROCEDURE: XR SHOULDER 2+ VW RIGHT  COMPARISON: None  INDICATIONS: pain  fall x 1 day ago decreased range of motion  FINDINGS:  Osteopenia.  Humeral head is superiorly located consistent with chronic rotator cuff tear.  There is severe glenohumeral joint space narrowing.  Sclerotic focus in the humeral head.  Ossified body lateral to the humeral head measures about 9 millimeters could be loose body within biceps tendon sheath.  Permeative pattern of bones could be seen with osteopenia or less likely metastatic disease or myeloma.  No acute fractures identified.       No acute fractures.  Osteopenia.  Diffuse permeative lytic appearance of the bones could be seen with severe osteopenia.  Myeloma or metastatic bone involvement less likely but not excluded. Chronic rotator cuff tear with severe glenohumeral joint and moderate acromioclavicular joint arthritis.  Ossified body likely within long head biceps tendon sheath.      JEFF CHÁVEZ MD       Electronically Signed and Approved By: JEFF CHÁVEZ MD on 1/22/2024 at 16:13             XR Chest 1 View    Result Date: 1/22/2024  PROCEDURE: XR CHEST 1 VW  COMPARISON: [facility], CR, XR CHEST 1 VW, 1/02/2022, 16:43.  [facility], CT, CT CHEST WO CONTRAST DIAGNOSTIC, 1/04/2022, 14:41.  INDICATIONS: fall, melinda rib pain  FINDINGS:  Cardiomegaly.  Diffuse septal thickening and nodularity throughout the lungs.  No pneumothorax or pleural effusion.  Osteopenia.  No acute displaced rib fractures.       Diffuse nodular and reticular opacity in the lungs.  This is increased.  There may be bronchiectasis in the lower lobes.  Consider CT chest for better evaluation.  Osteopenia.  Cardiomegaly.  No acute displaced rib fractures on radiograph.       JEFF CHÁVEZ MD       Electronically Signed and Approved By: JEFF CHÁVEZ MD on 1/22/2024 at 16:11                Differential Diagnosis and Discussion:      Abdominal Pain: Based on the patient's signs and symptoms, I considered abdominal aortic aneurysm, small bowel obstruction,  pancreatitis, acute cholecystitis, acute appendecitis, peptic ulcer disease, gastritis, colitis, endocrine disorders, irritable bowel syndrome and other differential diagnosis an etiology of the patient's abdominal pain.  Back Pain: The patient presents with back pain. My differential diagnosis includes but is not limited to acute spinal epidural abscess, acute spinal epidural bleed, cauda equina syndrome, abdominal aortic aneurysm, aortic dissection, kidney stone, pyelonephritis, musculoskeletal back pain, spinal fracture, and osteoarthritis.   Dizziness: Based on the patient's history, signs, and symptoms, the diffential diagnosis includes but is not limited to meningitis, stroke, sepsis, subarachnoid hemorrhage, intracranial bleeding, encephalitis, vertigo, electrolyte imbalance, and metabolic disorders.  Headache: Differential diagnosis includes but is not limited to migraine, cluster headache, hypertension, tumor, subarachnoid bleeding, pseudotumor cerebri, temporal arteritis, infections, tension headache, and TMJ syndrome.  Joint Pain: Differential diagnosis includes but is not limited to polyarticular arthritis, gout, tendinitis, hemarthrosis, septic arthritis, rheumatoid arthritis, bursitis, degenerative joint disease, joint effusion, autoimmune disorder, trauma, and occult neoplasm.  Neck Pain: The patient presents with neck pain. My differential diagnosis includes but is not limited to acute spinal epidural abscess, acute spinal epidural bleed, meningitis, musculoskeletal neck pain, spinal fracture, and osteoarthritis.     All labs were reviewed and interpreted by me.  All X-rays impressions were independently interpreted by me.  EKG was interpreted by me.  CT scan radiology impression was interpreted by me.    MDM     Amount and/or Complexity of Data Reviewed  Clinical lab tests: reviewed  Tests in the radiology section of CPT®: reviewed  Tests in the medicine section of CPT®: reviewed  Decide to obtain  previous medical records or to obtain history from someone other than the patient: yes                 Patient Care Considerations:    ANTIBIOTICS: I considered prescribing antibiotics as an outpatient however no bacterial focus of infection was found.      Consultants/Shared Management Plan:    None    Social Determinants of Health:    Patient has presented with family members who are responsible, reliable and will ensure follow up care.      Disposition and Care Coordination:    Discharged: The patient is suitable and stable for discharge with no need for consideration of admission.    I have explained the patient´s condition, diagnoses and treatment plan based on the information available to me at this time. I have answered questions and addressed any concerns. The patient has a good  understanding of the patient´s diagnosis, condition, and treatment plan as can be expected at this point. The vital signs have been stable. The patient´s condition is stable and appropriate for discharge from the emergency department.      The patient will pursue further outpatient evaluation with the primary care physician or other designated or consulting physician as outlined in the discharge instructions. They are agreeable to this plan of care and follow-up instructions have been explained in detail. The patient has received these instructions in written format and have expressed an understanding of the discharge instructions. The patient is aware that any significant change in condition or worsening of symptoms should prompt an immediate return to this or the closest emergency department or call to 911.  I have explained discharge medications and the need for follow up with the patient/caretakers. This was also printed in the discharge instructions. Patient was discharged with the following medications and follow up:      Medication List        New Prescriptions      ondansetron ODT 4 MG disintegrating tablet  Commonly known  as: ZOFRAN-ODT  Place 1 tablet on the tongue Every 8 (Eight) Hours As Needed for Nausea or Vomiting.     traMADol 50 MG tablet  Commonly known as: ULTRAM  Take 1 tablet by mouth Every 6 (Six) Hours As Needed for Moderate Pain.               Where to Get Your Medications        These medications were sent to [facility] - [address] - [phone]  - [phone] [address] [address]      Phone: [phone]   ondansetron ODT 4 MG disintegrating tablet  traMADol 50 MG tablet      No follow-up provider specified.     Final diagnoses:   Fall in home, initial encounter   Tear of right rotator cuff, unspecified tear extent, unspecified whether traumatic (chronic)   Arthritis of right acromioclavicular joint   Renal cyst   Ground glass opacity present on imaging of lung        ED Disposition       ED Disposition   Discharge    Condition   Stable    Comment   --               This medical record created using voice recognition software.             Orquidea Umana PA-C  01/22/24 1928

## 2024-01-23 NOTE — DISCHARGE INSTRUCTIONS
All your imaging done today does not show any acute fractures or dislocation    CT of your abdomen shows you have right renal cyst, please follow-up with urology or your PCP  X-ray of your shoulder shows that you have a rotator cuff tear, we have given you a shoulder sling and I put in a referral for them to call you to schedule follow-up appointment    CT of your lung showed that you have groundglass opacities seen on prior imaging, please follow-up with your PCP    Have sent Zofran for nausea and Ultram for pain as needed

## 2024-01-27 LAB
QT INTERVAL: 362 MS
QTC INTERVAL: 442 MS